# Patient Record
Sex: FEMALE | Race: WHITE
[De-identification: names, ages, dates, MRNs, and addresses within clinical notes are randomized per-mention and may not be internally consistent; named-entity substitution may affect disease eponyms.]

---

## 2018-06-15 ENCOUNTER — HOSPITAL ENCOUNTER (EMERGENCY)
Dept: HOSPITAL 94 - ER | Age: 43
Discharge: HOME | End: 2018-06-15
Payer: MEDICAID

## 2018-06-15 VITALS — DIASTOLIC BLOOD PRESSURE: 74 MMHG | SYSTOLIC BLOOD PRESSURE: 102 MMHG

## 2018-06-15 VITALS — WEIGHT: 132.28 LBS | HEIGHT: 62 IN | BODY MASS INDEX: 24.34 KG/M2

## 2018-06-15 DIAGNOSIS — F12.90: ICD-10-CM
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
## 2018-06-15 DIAGNOSIS — Z88.6: ICD-10-CM

## 2018-06-15 DIAGNOSIS — S01.01XA: Primary | ICD-10-CM
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
## 2018-06-15 DIAGNOSIS — Z90.49: ICD-10-CM

## 2018-06-15 DIAGNOSIS — Z79.899: ICD-10-CM

## 2018-06-15 DIAGNOSIS — F15.90: ICD-10-CM
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
## 2018-06-15 PROCEDURE — 12001 RPR S/N/AX/GEN/TRNK 2.5CM/<: CPT

## 2018-06-15 PROCEDURE — 99284 EMERGENCY DEPT VISIT MOD MDM: CPT

## 2018-06-15 PROCEDURE — 70450 CT HEAD/BRAIN W/O DYE: CPT

## 2018-09-15 ENCOUNTER — HOSPITAL ENCOUNTER (EMERGENCY)
Dept: HOSPITAL 94 - ER | Age: 43
Discharge: HOME | End: 2018-09-15
Payer: MEDICAID

## 2018-09-15 VITALS — HEIGHT: 62 IN | WEIGHT: 147.71 LBS | BODY MASS INDEX: 27.18 KG/M2

## 2018-09-15 VITALS — DIASTOLIC BLOOD PRESSURE: 85 MMHG | SYSTOLIC BLOOD PRESSURE: 122 MMHG

## 2018-09-15 DIAGNOSIS — F15.90: ICD-10-CM

## 2018-09-15 DIAGNOSIS — Z98.890: ICD-10-CM

## 2018-09-15 DIAGNOSIS — Z86.19: ICD-10-CM

## 2018-09-15 DIAGNOSIS — F41.9: Primary | ICD-10-CM

## 2018-09-15 DIAGNOSIS — Z79.899: ICD-10-CM

## 2018-09-15 DIAGNOSIS — Z88.8: ICD-10-CM

## 2018-09-15 DIAGNOSIS — F12.90: ICD-10-CM

## 2018-09-15 DIAGNOSIS — Z88.6: ICD-10-CM

## 2018-09-15 DIAGNOSIS — Z90.49: ICD-10-CM

## 2018-09-15 DIAGNOSIS — F32.9: ICD-10-CM

## 2018-09-15 PROCEDURE — 99284 EMERGENCY DEPT VISIT MOD MDM: CPT

## 2019-03-14 ENCOUNTER — HOSPITAL ENCOUNTER (EMERGENCY)
Dept: HOSPITAL 94 - ER | Age: 44
Discharge: LEFT BEFORE DISCHARGE | End: 2019-03-14
Payer: MEDICAID

## 2019-03-14 VITALS — DIASTOLIC BLOOD PRESSURE: 88 MMHG | SYSTOLIC BLOOD PRESSURE: 123 MMHG

## 2019-03-14 VITALS — WEIGHT: 160.34 LBS | BODY MASS INDEX: 29.51 KG/M2 | HEIGHT: 62 IN
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
## 2019-03-14 DIAGNOSIS — Z53.21: ICD-10-CM

## 2019-03-14 DIAGNOSIS — M79.641: ICD-10-CM

## 2019-03-14 DIAGNOSIS — M79.642: Primary | ICD-10-CM

## 2019-03-14 DIAGNOSIS — M79.604: ICD-10-CM
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
## 2019-03-14 DIAGNOSIS — M79.605: ICD-10-CM
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
## 2019-03-14 DIAGNOSIS — M54.9: ICD-10-CM

## 2019-03-14 PROCEDURE — 73130 X-RAY EXAM OF HAND: CPT

## 2019-03-22 ENCOUNTER — HOSPITAL ENCOUNTER (INPATIENT)
Dept: HOSPITAL 94 - ADULT MH | Age: 44
LOS: 4 days | Discharge: HOME | DRG: 754 | End: 2019-03-26
Attending: PSYCHIATRY & NEUROLOGY | Admitting: PSYCHIATRY & NEUROLOGY
Payer: MEDICAID

## 2019-03-22 VITALS — DIASTOLIC BLOOD PRESSURE: 53 MMHG | SYSTOLIC BLOOD PRESSURE: 95 MMHG

## 2019-03-22 VITALS — WEIGHT: 164.02 LBS | BODY MASS INDEX: 30.18 KG/M2 | HEIGHT: 62 IN

## 2019-03-22 DIAGNOSIS — Z87.440: ICD-10-CM

## 2019-03-22 DIAGNOSIS — I95.9: ICD-10-CM

## 2019-03-22 DIAGNOSIS — Z90.49: ICD-10-CM

## 2019-03-22 DIAGNOSIS — Z80.1: ICD-10-CM

## 2019-03-22 DIAGNOSIS — F11.20: ICD-10-CM

## 2019-03-22 DIAGNOSIS — F32.9: Primary | ICD-10-CM

## 2019-03-22 DIAGNOSIS — F17.210: ICD-10-CM

## 2019-03-22 DIAGNOSIS — Z82.3: ICD-10-CM

## 2019-03-22 DIAGNOSIS — Z88.8: ICD-10-CM

## 2019-03-22 DIAGNOSIS — Z59.0: ICD-10-CM

## 2019-03-22 DIAGNOSIS — B19.20: ICD-10-CM

## 2019-03-22 DIAGNOSIS — F10.10: ICD-10-CM

## 2019-03-22 DIAGNOSIS — F41.9: ICD-10-CM

## 2019-03-22 DIAGNOSIS — Z86.61: ICD-10-CM

## 2019-03-22 DIAGNOSIS — Z98.84: ICD-10-CM

## 2019-03-22 DIAGNOSIS — Z88.6: ICD-10-CM

## 2019-03-22 DIAGNOSIS — D64.9: ICD-10-CM

## 2019-03-22 PROCEDURE — 84100 ASSAY OF PHOSPHORUS: CPT

## 2019-03-22 PROCEDURE — 82607 VITAMIN B-12: CPT

## 2019-03-22 PROCEDURE — 83540 ASSAY OF IRON: CPT

## 2019-03-22 PROCEDURE — 80053 COMPREHEN METABOLIC PANEL: CPT

## 2019-03-22 PROCEDURE — 83735 ASSAY OF MAGNESIUM: CPT

## 2019-03-22 PROCEDURE — 80320 DRUG SCREEN QUANTALCOHOLS: CPT

## 2019-03-22 PROCEDURE — 85610 PROTHROMBIN TIME: CPT

## 2019-03-22 PROCEDURE — 87070 CULTURE OTHR SPECIMN AEROBIC: CPT

## 2019-03-22 PROCEDURE — 83036 HEMOGLOBIN GLYCOSYLATED A1C: CPT

## 2019-03-22 PROCEDURE — 80061 LIPID PANEL: CPT

## 2019-03-22 PROCEDURE — 36415 COLL VENOUS BLD VENIPUNCTURE: CPT

## 2019-03-22 PROCEDURE — 85025 COMPLETE CBC W/AUTO DIFF WBC: CPT

## 2019-03-22 SDOH — ECONOMIC STABILITY - HOUSING INSECURITY: HOMELESSNESS: Z59.0
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
## 2019-03-23 VITALS — SYSTOLIC BLOOD PRESSURE: 94 MMHG | DIASTOLIC BLOOD PRESSURE: 42 MMHG

## 2019-03-23 VITALS — SYSTOLIC BLOOD PRESSURE: 97 MMHG | DIASTOLIC BLOOD PRESSURE: 57 MMHG

## 2019-03-23 VITALS — DIASTOLIC BLOOD PRESSURE: 65 MMHG | SYSTOLIC BLOOD PRESSURE: 102 MMHG

## 2019-03-23 LAB
ALBUMIN SERPL BCP-MCNC: 2.8 G/DL (ref 3.4–5)
ALBUMIN/GLOB SERPL: 0.7 {RATIO} (ref 1.1–1.5)
ALP SERPL-CCNC: 238 IU/L (ref 46–116)
ALT SERPL W P-5'-P-CCNC: 39 U/L (ref 12–78)
ANION GAP SERPL CALCULATED.3IONS-SCNC: 7 MMOL/L (ref 8–16)
ANISOCYTOSIS BLD QL SMEAR: (no result)
AST SERPL W P-5'-P-CCNC: 42 U/L (ref 10–37)
BASOPHILS # BLD AUTO: 0 X10'3 (ref 0–0.2)
BASOPHILS NFR BLD AUTO: 0.6 % (ref 0–1)
BILIRUB SERPL-MCNC: 0.2 MG/DL (ref 0.1–1)
BUN SERPL-MCNC: 13 MG/DL (ref 7–18)
BUN/CREAT SERPL: 15.1 (ref 6.6–38)
CALCIUM SERPL-MCNC: 8.6 MG/DL (ref 8.5–10.1)
CHLORIDE SERPL-SCNC: 107 MMOL/L (ref 99–107)
CHOLEST SERPL-MCNC: 125 MG/DL (ref 0–200)
CHOLEST/HDLC SERPL: 2.6 {RATIO} (ref 0–4.99)
CO2 SERPL-SCNC: 27.4 MMOL/L (ref 24–32)
CREAT SERPL-MCNC: 0.86 MG/DL (ref 0.4–0.9)
DACRYOCYTES BLD QL SMEAR: (no result)
EOSINOPHIL # BLD AUTO: 0.1 X10'3 (ref 0–0.9)
EOSINOPHIL NFR BLD AUTO: 3.1 % (ref 0–6)
ERYTHROCYTE [DISTWIDTH] IN BLOOD BY AUTOMATED COUNT: 20.9 % (ref 11.5–14.5)
ETHANOL SERPL-MCNC: < 0.01 GM/DL (ref 0–0.01)
GFR SERPL CREATININE-BSD FRML MDRD: 72 ML/MIN
GLUCOSE SERPL-MCNC: 95 MG/DL (ref 70–104)
HBA1C MFR BLD: 5.3 % (ref 4.5–6.2)
HCT VFR BLD AUTO: 29.1 % (ref 35–45)
HDLC SERPL-MCNC: 48 MG/DL (ref 35–60)
HGB BLD-MCNC: 8.8 G/DL (ref 12–16)
HYPOCHROMIA BLD QL SMEAR: (no result)
LDLC SERPL DIRECT ASSAY-MCNC: 71 MG/DL (ref 50–100)
LYMPHOCYTES # BLD AUTO: 1.1 X10'3 (ref 1.1–4.8)
LYMPHOCYTES NFR BLD AUTO: 34.7 % (ref 21–51)
MCH RBC QN AUTO: 20.4 PG (ref 27–31)
MCHC RBC AUTO-ENTMCNC: 30.4 G/DL (ref 33–36.5)
MCV RBC AUTO: 67 FL (ref 78–98)
MICROCYTES BLD QL SMEAR: (no result)
MONOCYTES # BLD AUTO: 0.3 X10'3 (ref 0–0.9)
MONOCYTES NFR BLD AUTO: 10 % (ref 2–12)
NEUTROPHILS # BLD AUTO: 1.6 X10'3 (ref 1.8–7.7)
NEUTROPHILS NFR BLD AUTO: 51.6 % (ref 42–75)
PLATELET # BLD AUTO: 214 X10'3 (ref 140–440)
PLATELET BLD QL SMEAR: NORMAL
PMV BLD AUTO: 8.6 FL (ref 7.4–10.4)
POLYCHROMASIA BLD QL SMEAR: (no result)
POTASSIUM SERPL-SCNC: 4.4 MMOL/L (ref 3.5–5.1)
PROT SERPL-MCNC: 6.9 G/DL (ref 6.4–8.2)
RBC # BLD AUTO: 4.35 X10'6 (ref 4.2–5.6)
RBC MORPH BLD: (no result)
SODIUM SERPL-SCNC: 141 MMOL/L (ref 135–145)
STOMATOCYTES BLD QL SMEAR: (no result)
TRIGL SERPL-MCNC: 43 MG/DL (ref 20–135)
WBC # BLD AUTO: 3.1 X10'3 (ref 4.5–11)

## 2019-03-23 RX ADMIN — ONDANSETRON PRN MG: 4 TABLET, ORALLY DISINTEGRATING ORAL at 17:41

## 2019-03-23 RX ADMIN — THERA TABS SCH EACH: TAB at 11:00

## 2019-03-23 RX ADMIN — METHADONE HYDROCHLORIDE SCH MG: 10 TABLET ORAL at 12:55
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
## 2019-03-23 NOTE — NUR
METHADONE DOSE VERIFICATION

The patient attends [facility]. Methadone dose was verified today as 140mg qd. 
Her last dose prior to admission was Thursday 3/21/19 at 1300 per [facility]. Dr. Burger was made aware and new order received and carried out.

## 2019-03-24 VITALS — DIASTOLIC BLOOD PRESSURE: 55 MMHG | SYSTOLIC BLOOD PRESSURE: 113 MMHG

## 2019-03-24 VITALS — DIASTOLIC BLOOD PRESSURE: 47 MMHG | SYSTOLIC BLOOD PRESSURE: 119 MMHG

## 2019-03-24 VITALS — DIASTOLIC BLOOD PRESSURE: 62 MMHG | SYSTOLIC BLOOD PRESSURE: 98 MMHG

## 2019-03-24 LAB
INR PPP: 1.1 INR
MAGNESIUM SERPL-MCNC: 2.1 MG/DL (ref 1.5–2.4)
PHOSPHATE SERPL-MCNC: 4.2 MG/DL (ref 2.3–4.5)
PROTHROMBIN TIME: 10.7 SECONDS (ref 9–12)

## 2019-03-24 RX ADMIN — ONDANSETRON PRN MG: 4 TABLET, ORALLY DISINTEGRATING ORAL at 04:56

## 2019-03-24 RX ADMIN — THERA TABS SCH EACH: TAB at 08:55

## 2019-03-24 RX ADMIN — METHADONE HYDROCHLORIDE SCH MG: 10 TABLET ORAL at 08:55

## 2019-03-24 NOTE — NUR
Nutrition consult: Pt admit w/ depression w/ hx cipriano-en-y, etoh, and 

pernicious anemia. MCV 67 on admit surprisingly low given hx. RD d/w RN 

for B12 and Fe labs per MD approval. Pt receiving thiamin/folic/MVI for 

etoh hx meeting vitamin needs. % mechanical soft diet meeting needs. 

If B12 returns normal may benefit from MMA lab for further specificity 

given GI hx. LBM 3/23. Will continue to monitor.

Rec:

1. continue mechanical soft diet

2. thiamin/folic/MVI for etoh; following protocol MVM for cipriano-en-y hx

3. monitor for further Fe/B12 needs given labs per MD approval

4. wt per rx

-------------------------------------------------------------------------------

Addendum: 03/24/19 at 1247 by Brody Correia RD

-------------------------------------------------------------------------------

Amended: Links added.
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
## 2019-03-24 NOTE — NUR
Pt had tremors w/outstretched arms, moderate headache and sweating, and some anxiety, she 
was provided w/Ativan 2mg. She attempted to watch tv but fell asleep in rec room, was 
assisted back to bed.  She is currently sleeping, rr even and unlabored, no s/s distress. 

-------------------------------------------------------------------------------

Addendum: 03/24/19 at 0440 by Odette Moreno RN

-------------------------------------------------------------------------------

Amended: Links added.

## 2019-03-24 NOTE — NUR
Nursing Progress Note



Legal hold: 5150



Client on involuntary status for DTS.



Received report from Fabi WALLER



Why are they here: Pt reporting thoughts of overdosing on heroin if she went home or was 
left home. Reported increase in depression and suicidal thoughts, overwhelmed with guilt, 
hopelessness "things will not change." Drinking more to cope



Assessment



What happened this shift: 

Received patient lying in bed. Patient was sedated and difficult to fully aroused. Patient 
did get up for breakfast and received her medications. Patient agreeable to sitting up in 
the room and remaining on the unit after breakfast. Patient remains on CIWA every two hours 
in the morning but was decreased to every four hours because of low scores. Patient did seek 
meds toward the middle of the day complaining of more anxiety and wanting Ativan. PA 
questioned whether he wanted her to have some and he gave order to discontinue all Ativan 
and offer atarax which patient refused. Patient upset initially, but eventually went to her 
room and lay down taking a nap until dinner. No further complaints. Patient did not endorse 
depression and denies suicidal thoughts today.



S/I, H/I: denies s/i 

A/VH: denies 

Sleep: cat naps through out the day. 

ADL's: Independent

Group attendance:  attended groups 

Were meds taken: yes

Any med S/E: N/A



Mental Status Exam



Appearance: unkempt, wearing heavy jacket and pajamas. 

Eye contact: Direct

Behavior: Cooperative and friendly, laughing 

Speech: Clear, soft tone, normal rate/rhythm 

Mood:  reports anxiety and agitation, none observed

Affect: constricted 

Thought process: linear 

Thought Content: talking about her relationship w/her daughter

Cognition: A/O x4 

Insight: Fair

Judgment: Fair



Interventions



PRN's used: 



Therapeutic interventions: 1:1 therapeutic assessment, active listening that included 
positive feedback, medication education, and monitoring, maintained safe therapeutic milieu, 
encouraged to attend groups, Q 15 min safety checks.



Restraints/seclusion/emergency medication: N/A



Justification of Continued Inpatient Treatment: Continued therapeutic support and medication 
management needed to provide stabilization, prevent decompensation while decreasing risk to 
patient.

## 2019-03-25 VITALS — DIASTOLIC BLOOD PRESSURE: 64 MMHG | SYSTOLIC BLOOD PRESSURE: 100 MMHG

## 2019-03-25 VITALS — SYSTOLIC BLOOD PRESSURE: 100 MMHG | DIASTOLIC BLOOD PRESSURE: 52 MMHG

## 2019-03-25 LAB
INR PPP: 1 INR
MAGNESIUM SERPL-MCNC: 2.1 MG/DL (ref 1.5–2.4)
PHOSPHATE SERPL-MCNC: 4.1 MG/DL (ref 2.3–4.5)
PROTHROMBIN TIME: 10.5 SECONDS (ref 9–12)

## 2019-03-25 RX ADMIN — ONDANSETRON PRN MG: 4 TABLET, ORALLY DISINTEGRATING ORAL at 07:21

## 2019-03-25 RX ADMIN — THERA TABS SCH EACH: TAB at 07:27

## 2019-03-25 RX ADMIN — METHADONE HYDROCHLORIDE SCH MG: 10 TABLET ORAL at 07:22
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
## 2019-03-26 VITALS — SYSTOLIC BLOOD PRESSURE: 94 MMHG | DIASTOLIC BLOOD PRESSURE: 56 MMHG

## 2019-03-26 LAB
INR PPP: 1 INR
MAGNESIUM SERPL-MCNC: 2 MG/DL (ref 1.5–2.4)
PHOSPHATE SERPL-MCNC: 4.3 MG/DL (ref 2.3–4.5)
PROTHROMBIN TIME: 10.4 SECONDS (ref 9–12)

## 2019-03-26 RX ADMIN — METHADONE HYDROCHLORIDE SCH MG: 10 TABLET ORAL at 07:12

## 2019-03-26 RX ADMIN — THERA TABS SCH EACH: TAB at 07:19

## 2019-03-26 NOTE — NUR
Nursing Discharge Note



Patient is accompanied off the unit by Charge Nurse Fabi. She is picked up by her SO and 
transported to her friends home at [address] [place] [address]. All valuables were with 
patient. Discharge instructions were provided to patient and she will make her own 
appointments. Handout with instructions given to patient. Her affect is bright and she 
appears happy to be leaving. Patient denies S/I this morning during assessment and no 
suicidal statements were made throughout the day. Patient has improved since admit but 
states she will drink when she gets off the unit. Coping skills are gone over with patient 
and numbers/places to access for help are provided. No acute physical or emotional distress 
observed. Nicotine replacement is not offered as it is N/A.

## 2019-12-20 ENCOUNTER — HOSPITAL ENCOUNTER (EMERGENCY)
Dept: HOSPITAL 94 - ER | Age: 44
LOS: 1 days | Discharge: PSYCHIATRIC OR SUBSTANCE USE FACILITY | End: 2019-12-21
Payer: MEDICAID

## 2019-12-20 VITALS — BODY MASS INDEX: 33.23 KG/M2 | HEIGHT: 62 IN | WEIGHT: 180.56 LBS

## 2019-12-20 DIAGNOSIS — Z79.899: ICD-10-CM

## 2019-12-20 DIAGNOSIS — F17.200: ICD-10-CM

## 2019-12-20 DIAGNOSIS — F32.9: Primary | ICD-10-CM

## 2019-12-20 DIAGNOSIS — Z98.890: ICD-10-CM

## 2019-12-20 DIAGNOSIS — Z88.8: ICD-10-CM

## 2019-12-20 DIAGNOSIS — F10.10: ICD-10-CM

## 2019-12-20 DIAGNOSIS — Z86.2: ICD-10-CM

## 2019-12-20 DIAGNOSIS — F41.9: ICD-10-CM

## 2019-12-20 DIAGNOSIS — F12.90: ICD-10-CM

## 2019-12-20 DIAGNOSIS — R74.0: ICD-10-CM

## 2019-12-20 DIAGNOSIS — Z86.19: ICD-10-CM

## 2019-12-20 DIAGNOSIS — Z98.51: ICD-10-CM

## 2019-12-20 DIAGNOSIS — Z90.49: ICD-10-CM
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
## 2019-12-20 DIAGNOSIS — F15.90: ICD-10-CM

## 2019-12-20 LAB
ALBUMIN SERPL BCP-MCNC: 2.9 G/DL (ref 3.4–5)
ALBUMIN/GLOB SERPL: 0.6 {RATIO} (ref 1.1–1.5)
ALP SERPL-CCNC: 458 IU/L (ref 46–116)
ALT SERPL W P-5'-P-CCNC: 109 U/L (ref 12–78)
AMPHETAMINES UR QL SCN: POSITIVE
ANION GAP SERPL CALCULATED.3IONS-SCNC: 9 MMOL/L (ref 8–16)
ANISOCYTOSIS BLD QL SMEAR: (no result)
AST SERPL W P-5'-P-CCNC: 212 U/L (ref 10–37)
BARBITURATES UR QL SCN: NEGATIVE
BASOPHILS # BLD AUTO: 0.1 X10'3 (ref 0–0.2)
BASOPHILS NFR BLD AUTO: 1.2 % (ref 0–1)
BENZODIAZ UR QL SCN: NEGATIVE
BILIRUB SERPL-MCNC: 0.3 MG/DL (ref 0.1–1)
BUN SERPL-MCNC: 4 MG/DL (ref 7–18)
BUN/CREAT SERPL: 5.3 (ref 6.6–38)
BZE UR QL SCN: NEGATIVE
CALCIUM SERPL-MCNC: 8.4 MG/DL (ref 8.5–10.1)
CANNABINOIDS UR QL SCN: NEGATIVE
CHLORIDE SERPL-SCNC: 105 MMOL/L (ref 99–107)
CO2 SERPL-SCNC: 26 MMOL/L (ref 24–32)
CREAT SERPL-MCNC: 0.75 MG/DL (ref 0.4–0.9)
EOSINOPHIL # BLD AUTO: 0 X10'3 (ref 0–0.9)
EOSINOPHIL NFR BLD AUTO: 0.3 % (ref 0–6)
ERYTHROCYTE [DISTWIDTH] IN BLOOD BY AUTOMATED COUNT: 22.9 % (ref 11.5–14.5)
ETHANOL SERPL-MCNC: 0.14 GM/DL (ref 0–0.01)
GFR SERPL CREATININE-BSD FRML MDRD: 84 ML/MIN
GLUCOSE SERPL-MCNC: 81 MG/DL (ref 70–104)
HCG UR QL: NEGATIVE
HCT VFR BLD AUTO: 32.5 % (ref 35–45)
HGB BLD-MCNC: 10.1 G/DL (ref 12–16)
HYPOCHROMIA BLD QL SMEAR: (no result)
LYMPHOCYTES # BLD AUTO: 1.2 X10'3 (ref 1.1–4.8)
LYMPHOCYTES NFR BLD AUTO: 19.8 % (ref 21–51)
MCH RBC QN AUTO: 22.3 PG (ref 27–31)
MCHC RBC AUTO-ENTMCNC: 31.1 G/DL (ref 33–36.5)
MCV RBC AUTO: 71.7 FL (ref 78–98)
METHADONE UR QL SCN: POSITIVE
MICROCYTES BLD QL SMEAR: (no result)
MONOCYTES # BLD AUTO: 0.4 X10'3 (ref 0–0.9)
MONOCYTES NFR BLD AUTO: 5.8 % (ref 2–12)
NEUTROPHILS # BLD AUTO: 4.6 X10'3 (ref 1.8–7.7)
NEUTROPHILS NFR BLD AUTO: 72.9 % (ref 42–75)
OPIATES UR QL SCN: POSITIVE
PCP UR QL SCN: NEGATIVE
PLATELET # BLD AUTO: 253 X10'3 (ref 140–440)
PLATELET BLD QL SMEAR: NORMAL
PMV BLD AUTO: 6.7 FL (ref 7.4–10.4)
POIKILOCYTOSIS BLD QL SMEAR: (no result)
POLYCHROMASIA BLD QL SMEAR: (no result)
POTASSIUM SERPL-SCNC: 3.6 MMOL/L (ref 3.5–5.1)
PROT SERPL-MCNC: 7.6 G/DL (ref 6.4–8.2)
RBC # BLD AUTO: 4.53 X10'6 (ref 4.2–5.6)
RBC MORPH BLD: (no result)
SODIUM SERPL-SCNC: 140 MMOL/L (ref 135–145)
WBC # BLD AUTO: 6.3 X10'3 (ref 4.5–11)

## 2019-12-20 PROCEDURE — 80053 COMPREHEN METABOLIC PANEL: CPT

## 2019-12-20 PROCEDURE — 36415 COLL VENOUS BLD VENIPUNCTURE: CPT

## 2019-12-20 PROCEDURE — 84443 ASSAY THYROID STIM HORMONE: CPT

## 2019-12-20 PROCEDURE — 99285 EMERGENCY DEPT VISIT HI MDM: CPT

## 2019-12-20 PROCEDURE — 85025 COMPLETE CBC W/AUTO DIFF WBC: CPT

## 2019-12-20 PROCEDURE — 80320 DRUG SCREEN QUANTALCOHOLS: CPT

## 2019-12-20 PROCEDURE — 81025 URINE PREGNANCY TEST: CPT

## 2019-12-20 PROCEDURE — 80305 DRUG TEST PRSMV DIR OPT OBS: CPT
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
## 2019-12-20 NOTE — NUR
Med Reconcilication: Attempted to complete Med Rec, however unable to do so per 
pt. reports the only medicatoin she currently takes is Methadone and she is 
unaware of the current dose. Pt. reports that she is currently being treated at 
[facility], and her dose of Methadone is being tapered daily. She states that 
staff should be able to call the clinic to obtain her current dosing orders, 
will endorse to AM shift. Pt. dose report that her last dose of Methadone was 
today, 30mg.

## 2019-12-20 NOTE — NUR
Assumed care of patient, pt. sitting up talking with [facility] at this time, she 
appears calm and cooperative, rr even and ulabored.

## 2019-12-20 NOTE — NUR
Pt. c/o possible withdrawal s/s AEB sweating, nausea, and she exhibits slight 
bilateral upper extremity fine tremors. Obtained order from Dr. Laws for 2mg 
of Ativan, will continue to monitor. 



Also reported to Dr. Laws reddened abraisions with some enduration present 
on pt's bilateral lower extremities. MD advises this writer to, "Have AM shift 
doctor follow-up." Will endorse to AM shift. Abraisions cleaned with N/S and 
applied ABT ointment per MD orders. Pictures obtained and placed in pt's chart.

## 2019-12-21 VITALS — DIASTOLIC BLOOD PRESSURE: 66 MMHG | SYSTOLIC BLOOD PRESSURE: 107 MMHG

## 2019-12-21 NOTE — NUR
Pt. refused Librium, states, "It makes me sick," endorsed to AM shift, RN

-------------------------------------------------------------------------------

Addendum: 12/21/19 at 0654 by KERA

-------------------------------------------------------------------------------

Medicationed returned to pharmacy

## 2019-12-21 NOTE — NUR
SITTING UP IN BED WITHOUT COMPLAINTS AT THIS TIME. STATES NAUSEA SUBSIDING NOW 
AND WAS ABLE TO TOLERATE BREAKFAST.

## 2019-12-21 NOTE — NUR
Pt. reporting s/s of withdrawal AEB sweating, nausea, and fine tremors. 
Obtained order from Dr. Chang for Librium, will continue to monitor.

## 2020-01-10 ENCOUNTER — HOSPITAL ENCOUNTER (EMERGENCY)
Dept: HOSPITAL 94 - ER | Age: 45
Discharge: HOME | End: 2020-01-10
Payer: MEDICAID

## 2020-01-10 VITALS — WEIGHT: 154.32 LBS | BODY MASS INDEX: 28.4 KG/M2 | HEIGHT: 62 IN

## 2020-01-10 VITALS — SYSTOLIC BLOOD PRESSURE: 20 MMHG

## 2020-01-10 VITALS — DIASTOLIC BLOOD PRESSURE: 60 MMHG

## 2020-01-10 DIAGNOSIS — F32.9: ICD-10-CM

## 2020-01-10 DIAGNOSIS — Z98.890: ICD-10-CM

## 2020-01-10 DIAGNOSIS — Z79.899: ICD-10-CM

## 2020-01-10 DIAGNOSIS — F41.9: Primary | ICD-10-CM

## 2020-01-10 DIAGNOSIS — Z98.84: ICD-10-CM

## 2020-01-10 DIAGNOSIS — Z88.6: ICD-10-CM

## 2020-01-10 DIAGNOSIS — Z88.8: ICD-10-CM

## 2020-01-10 DIAGNOSIS — F12.90: ICD-10-CM

## 2020-01-10 DIAGNOSIS — Z90.49: ICD-10-CM

## 2020-01-10 DIAGNOSIS — F15.90: ICD-10-CM

## 2020-01-10 DIAGNOSIS — Z76.0: ICD-10-CM

## 2020-01-10 PROCEDURE — 99283 EMERGENCY DEPT VISIT LOW MDM: CPT

## 2020-01-18 ENCOUNTER — HOSPITAL ENCOUNTER (EMERGENCY)
Dept: HOSPITAL 94 - ER | Age: 45
Discharge: HOME | End: 2020-01-18
Payer: MEDICAID

## 2020-01-18 VITALS — BODY MASS INDEX: 32.46 KG/M2 | HEIGHT: 62 IN | WEIGHT: 176.37 LBS

## 2020-01-18 VITALS — SYSTOLIC BLOOD PRESSURE: 123 MMHG | DIASTOLIC BLOOD PRESSURE: 81 MMHG

## 2020-01-18 DIAGNOSIS — Z88.6: ICD-10-CM

## 2020-01-18 DIAGNOSIS — F15.90: ICD-10-CM

## 2020-01-18 DIAGNOSIS — F41.9: Primary | ICD-10-CM

## 2020-01-18 DIAGNOSIS — F32.9: ICD-10-CM

## 2020-01-18 DIAGNOSIS — F12.90: ICD-10-CM

## 2020-01-18 DIAGNOSIS — Z79.899: ICD-10-CM

## 2020-01-18 DIAGNOSIS — Z90.49: ICD-10-CM

## 2020-01-18 DIAGNOSIS — Z98.84: ICD-10-CM

## 2020-01-18 DIAGNOSIS — Z88.8: ICD-10-CM

## 2020-01-18 LAB
ALBUMIN SERPL BCP-MCNC: 3.4 G/DL (ref 3.4–5)
ALBUMIN/GLOB SERPL: 0.8 {RATIO} (ref 1.1–1.5)
ALP SERPL-CCNC: 283 IU/L (ref 46–116)
ALT SERPL W P-5'-P-CCNC: 38 U/L (ref 12–78)
ANION GAP SERPL CALCULATED.3IONS-SCNC: 8 MMOL/L (ref 8–16)
AST SERPL W P-5'-P-CCNC: 45 U/L (ref 10–37)
BASOPHILS # BLD AUTO: 0 X10'3 (ref 0–0.2)
BASOPHILS NFR BLD AUTO: 0.5 % (ref 0–1)
BILIRUB SERPL-MCNC: 0.3 MG/DL (ref 0.1–1)
BUN SERPL-MCNC: 6 MG/DL (ref 7–18)
BUN/CREAT SERPL: 7.9 (ref 6.6–38)
CALCIUM SERPL-MCNC: 8.8 MG/DL (ref 8.5–10.1)
CHLORIDE SERPL-SCNC: 105 MMOL/L (ref 99–107)
CO2 SERPL-SCNC: 28 MMOL/L (ref 24–32)
CREAT SERPL-MCNC: 0.76 MG/DL (ref 0.4–0.9)
EOSINOPHIL # BLD AUTO: 0.1 X10'3 (ref 0–0.9)
EOSINOPHIL NFR BLD AUTO: 2.8 % (ref 0–6)
ERYTHROCYTE [DISTWIDTH] IN BLOOD BY AUTOMATED COUNT: 21.3 % (ref 11.5–14.5)
ETHANOL SERPL-MCNC: < 0.01 GM/DL (ref 0–0.01)
GFR SERPL CREATININE-BSD FRML MDRD: 83 ML/MIN
GLUCOSE SERPL-MCNC: 110 MG/DL (ref 70–104)
HCT VFR BLD AUTO: 31.5 % (ref 35–45)
HGB BLD-MCNC: 9.6 G/DL (ref 12–16)
LYMPHOCYTES # BLD AUTO: 1.3 X10'3 (ref 1.1–4.8)
LYMPHOCYTES NFR BLD AUTO: 24.2 % (ref 21–51)
MCH RBC QN AUTO: 21.1 PG (ref 27–31)
MCHC RBC AUTO-ENTMCNC: 30.5 G/DL (ref 33–36.5)
MCV RBC AUTO: 69.1 FL (ref 78–98)
MONOCYTES # BLD AUTO: 0.4 X10'3 (ref 0–0.9)
MONOCYTES NFR BLD AUTO: 7.6 % (ref 2–12)
NEUTROPHILS # BLD AUTO: 3.4 X10'3 (ref 1.8–7.7)
NEUTROPHILS NFR BLD AUTO: 64.9 % (ref 42–75)
PLATELET # BLD AUTO: 291 X10'3 (ref 140–440)
PMV BLD AUTO: 7.3 FL (ref 7.4–10.4)
POTASSIUM SERPL-SCNC: 4.1 MMOL/L (ref 3.5–5.1)
PROT SERPL-MCNC: 7.5 G/DL (ref 6.4–8.2)
RBC # BLD AUTO: 4.55 X10'6 (ref 4.2–5.6)
SODIUM SERPL-SCNC: 141 MMOL/L (ref 135–145)
WBC # BLD AUTO: 5.2 X10'3 (ref 4.5–11)

## 2020-01-18 PROCEDURE — 80320 DRUG SCREEN QUANTALCOHOLS: CPT

## 2020-01-18 PROCEDURE — 80053 COMPREHEN METABOLIC PANEL: CPT

## 2020-01-18 PROCEDURE — 36415 COLL VENOUS BLD VENIPUNCTURE: CPT

## 2020-01-18 PROCEDURE — 99284 EMERGENCY DEPT VISIT MOD MDM: CPT

## 2020-01-18 PROCEDURE — 85025 COMPLETE CBC W/AUTO DIFF WBC: CPT

## 2020-01-19 LAB
ANISOCYTOSIS BLD QL SMEAR: (no result)
HYPOCHROMIA BLD QL SMEAR: (no result)
MICROCYTES BLD QL SMEAR: (no result)
PLATELET BLD QL SMEAR: NORMAL
RBC MORPH BLD: (no result)

## 2020-04-28 ENCOUNTER — HOSPITAL ENCOUNTER (EMERGENCY)
Dept: HOSPITAL 94 - ER | Age: 45
LOS: 1 days | Discharge: HOME | End: 2020-04-29
Payer: MEDICAID

## 2020-04-28 VITALS — BODY MASS INDEX: 33.19 KG/M2 | HEIGHT: 62 IN | WEIGHT: 180.38 LBS

## 2020-04-28 DIAGNOSIS — K29.20: ICD-10-CM

## 2020-04-28 DIAGNOSIS — E86.0: ICD-10-CM

## 2020-04-28 DIAGNOSIS — Z98.0: ICD-10-CM

## 2020-04-28 DIAGNOSIS — Z98.890: ICD-10-CM

## 2020-04-28 DIAGNOSIS — Z88.8: ICD-10-CM

## 2020-04-28 DIAGNOSIS — Z79.899: ICD-10-CM

## 2020-04-28 DIAGNOSIS — R11.2: Primary | ICD-10-CM

## 2020-04-28 DIAGNOSIS — Z88.6: ICD-10-CM

## 2020-04-28 DIAGNOSIS — F32.9: ICD-10-CM

## 2020-04-28 DIAGNOSIS — F10.10: ICD-10-CM

## 2020-04-28 DIAGNOSIS — F17.200: ICD-10-CM
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
## 2020-04-28 DIAGNOSIS — E87.6: ICD-10-CM

## 2020-04-28 DIAGNOSIS — Z90.49: ICD-10-CM

## 2020-04-28 DIAGNOSIS — F41.9: ICD-10-CM

## 2020-04-28 PROCEDURE — 96374 THER/PROPH/DIAG INJ IV PUSH: CPT

## 2020-04-28 PROCEDURE — 96361 HYDRATE IV INFUSION ADD-ON: CPT

## 2020-04-28 PROCEDURE — 81025 URINE PREGNANCY TEST: CPT

## 2020-04-28 PROCEDURE — 80305 DRUG TEST PRSMV DIR OPT OBS: CPT

## 2020-04-28 PROCEDURE — 87088 URINE BACTERIA CULTURE: CPT

## 2020-04-28 PROCEDURE — 87077 CULTURE AEROBIC IDENTIFY: CPT

## 2020-04-28 PROCEDURE — 83690 ASSAY OF LIPASE: CPT

## 2020-04-28 PROCEDURE — 87186 SC STD MICRODIL/AGAR DIL: CPT

## 2020-04-28 PROCEDURE — 36415 COLL VENOUS BLD VENIPUNCTURE: CPT

## 2020-04-28 PROCEDURE — 81001 URINALYSIS AUTO W/SCOPE: CPT

## 2020-04-28 PROCEDURE — 96375 TX/PRO/DX INJ NEW DRUG ADDON: CPT

## 2020-04-28 PROCEDURE — 80320 DRUG SCREEN QUANTALCOHOLS: CPT

## 2020-04-28 PROCEDURE — 99284 EMERGENCY DEPT VISIT MOD MDM: CPT

## 2020-04-28 PROCEDURE — 80053 COMPREHEN METABOLIC PANEL: CPT

## 2020-04-28 PROCEDURE — 85025 COMPLETE CBC W/AUTO DIFF WBC: CPT

## 2020-04-29 VITALS — SYSTOLIC BLOOD PRESSURE: 119 MMHG | DIASTOLIC BLOOD PRESSURE: 68 MMHG

## 2020-04-29 LAB
ALBUMIN SERPL BCP-MCNC: 3 G/DL (ref 3.4–5)
ALBUMIN/GLOB SERPL: 0.8 {RATIO} (ref 1.1–1.5)
ALP SERPL-CCNC: 303 IU/L (ref 46–116)
ALT SERPL W P-5'-P-CCNC: 48 U/L (ref 12–78)
AMPHETAMINES UR QL SCN: NEGATIVE
ANION GAP SERPL CALCULATED.3IONS-SCNC: 10 MMOL/L (ref 8–16)
ANISOCYTOSIS BLD QL SMEAR: (no result)
AST SERPL W P-5'-P-CCNC: 74 U/L (ref 10–37)
BACTERIA URNS QL MICRO: (no result) /HPF
BARBITURATES UR QL SCN: NEGATIVE
BASOPHILS # BLD AUTO: 0 X10'3 (ref 0–0.2)
BASOPHILS NFR BLD AUTO: 0.5 % (ref 0–1)
BENZODIAZ UR QL SCN: NEGATIVE
BILIRUB SERPL-MCNC: 0.4 MG/DL (ref 0.1–1)
BUN SERPL-MCNC: 5 MG/DL (ref 7–18)
BUN/CREAT SERPL: 6.6 (ref 6.6–38)
BZE UR QL SCN: NEGATIVE
CALCIUM SERPL-MCNC: 8.1 MG/DL (ref 8.5–10.1)
CANNABINOIDS UR QL SCN: POSITIVE
CHLORIDE SERPL-SCNC: 106 MMOL/L (ref 99–107)
CLARITY UR: CLEAR
CO2 SERPL-SCNC: 24.6 MMOL/L (ref 24–32)
COLOR UR: (no result)
CREAT SERPL-MCNC: 0.76 MG/DL (ref 0.4–0.9)
DEPRECATED SQUAMOUS URNS QL MICRO: (no result) /LPF
EOSINOPHIL # BLD AUTO: 0 X10'3 (ref 0–0.9)
EOSINOPHIL NFR BLD AUTO: 1 % (ref 0–6)
ERYTHROCYTE [DISTWIDTH] IN BLOOD BY AUTOMATED COUNT: 23.2 % (ref 11.5–14.5)
ETHANOL SERPL-MCNC: 0.02 GM/DL (ref 0–0.01)
GFR SERPL CREATININE-BSD FRML MDRD: 83 ML/MIN
GLUCOSE SERPL-MCNC: 80 MG/DL (ref 70–104)
GLUCOSE UR STRIP-MCNC: NEGATIVE MG/DL
HCG UR QL: NEGATIVE
HCT VFR BLD AUTO: 30.6 % (ref 35–45)
HGB BLD-MCNC: 9.3 G/DL (ref 12–16)
HGB UR QL STRIP: (no result)
KETONES UR STRIP-MCNC: NEGATIVE MG/DL
LEUKOCYTE ESTERASE UR QL STRIP: (no result)
LIPASE SERPL-CCNC: 138 U/L (ref 73–393)
LYMPHOCYTES # BLD AUTO: 0.9 X10'3 (ref 1.1–4.8)
LYMPHOCYTES NFR BLD AUTO: 21.7 % (ref 21–51)
MCH RBC QN AUTO: 21.6 PG (ref 27–31)
MCHC RBC AUTO-ENTMCNC: 30.4 G/DL (ref 33–36.5)
MCV RBC AUTO: 71 FL (ref 78–98)
METHADONE UR QL SCN: POSITIVE
MICROCYTES BLD QL SMEAR: (no result)
MONOCYTES # BLD AUTO: 0.6 X10'3 (ref 0–0.9)
MONOCYTES NFR BLD AUTO: 12.7 % (ref 2–12)
NEUTROPHILS # BLD AUTO: 2.8 X10'3 (ref 1.8–7.7)
NEUTROPHILS NFR BLD AUTO: 64.1 % (ref 42–75)
NITRITE UR QL STRIP: NEGATIVE
OPIATES UR QL SCN: NEGATIVE
PCP UR QL SCN: NEGATIVE
PH UR STRIP: 6.5 [PH] (ref 4.8–8)
PLATELET # BLD AUTO: 199 X10'3 (ref 140–440)
PLATELET BLD QL SMEAR: NORMAL
PMV BLD AUTO: 8.3 FL (ref 7.4–10.4)
POTASSIUM SERPL-SCNC: 2.8 MMOL/L (ref 3.5–5.1)
PROT SERPL-MCNC: 7 G/DL (ref 6.4–8.2)
PROT UR QL STRIP: NEGATIVE MG/DL
RBC # BLD AUTO: 4.32 X10'6 (ref 4.2–5.6)
RBC #/AREA URNS HPF: (no result) /HPF (ref 0–2)
RBC MORPH BLD: (no result)
SODIUM SERPL-SCNC: 141 MMOL/L (ref 135–145)
SP GR UR STRIP: <=1.005 (ref 1–1.03)
URN COLLECT METHOD CLASS: (no result)
UROBILINOGEN UR STRIP-MCNC: 0.2 E.U/DL (ref 0.2–1)
WBC # BLD AUTO: 4.4 X10'3 (ref 4.5–11)
WBC #/AREA URNS HPF: (no result) /HPF (ref 0–4)

## 2020-07-31 ENCOUNTER — HOSPITAL ENCOUNTER (EMERGENCY)
Dept: HOSPITAL 94 - ER | Age: 45
Discharge: HOME | End: 2020-07-31
Payer: MEDICAID

## 2020-07-31 VITALS — HEIGHT: 62 IN | WEIGHT: 183.38 LBS | BODY MASS INDEX: 33.75 KG/M2

## 2020-07-31 VITALS — DIASTOLIC BLOOD PRESSURE: 61 MMHG | SYSTOLIC BLOOD PRESSURE: 101 MMHG

## 2020-07-31 DIAGNOSIS — Z86.2: ICD-10-CM

## 2020-07-31 DIAGNOSIS — F41.9: ICD-10-CM

## 2020-07-31 DIAGNOSIS — M54.5: ICD-10-CM

## 2020-07-31 DIAGNOSIS — Z79.899: ICD-10-CM

## 2020-07-31 DIAGNOSIS — N10: Primary | ICD-10-CM

## 2020-07-31 DIAGNOSIS — F32.9: ICD-10-CM

## 2020-07-31 DIAGNOSIS — Z86.19: ICD-10-CM

## 2020-07-31 DIAGNOSIS — F10.10: ICD-10-CM

## 2020-07-31 DIAGNOSIS — R11.10: ICD-10-CM

## 2020-07-31 DIAGNOSIS — Z90.49: ICD-10-CM

## 2020-07-31 LAB
ALBUMIN SERPL BCP-MCNC: 3.1 G/DL (ref 3.4–5)
ALBUMIN/GLOB SERPL: 0.7 {RATIO} (ref 1.1–1.5)
ALP SERPL-CCNC: 274 IU/L (ref 46–116)
ALT SERPL W P-5'-P-CCNC: 48 U/L (ref 12–78)
ANION GAP SERPL CALCULATED.3IONS-SCNC: 7 MMOL/L (ref 8–16)
ANISOCYTOSIS BLD QL SMEAR: (no result)
AST SERPL W P-5'-P-CCNC: 97 U/L (ref 10–37)
BACTERIA URNS QL MICRO: (no result) /HPF
BASOPHILS # BLD AUTO: 0 X10'3 (ref 0–0.2)
BASOPHILS NFR BLD AUTO: 0.8 % (ref 0–1)
BILIRUB SERPL-MCNC: 0.3 MG/DL (ref 0.1–1)
BUN SERPL-MCNC: 7 MG/DL (ref 7–18)
BUN/CREAT SERPL: 8.9 (ref 6.6–38)
CALCIUM SERPL-MCNC: 8.5 MG/DL (ref 8.5–10.1)
CHLORIDE SERPL-SCNC: 104 MMOL/L (ref 99–107)
CLARITY UR: (no result)
CO2 SERPL-SCNC: 25.2 MMOL/L (ref 24–32)
COLOR UR: YELLOW
CREAT SERPL-MCNC: 0.79 MG/DL (ref 0.4–0.9)
DACRYOCYTES BLD QL SMEAR: (no result)
DEPRECATED SQUAMOUS URNS QL MICRO: (no result) /LPF
EOSINOPHIL # BLD AUTO: 0.2 X10'3 (ref 0–0.9)
EOSINOPHIL NFR BLD AUTO: 3.7 % (ref 0–6)
ERYTHROCYTE [DISTWIDTH] IN BLOOD BY AUTOMATED COUNT: 22.8 % (ref 11.5–14.5)
GFR SERPL CREATININE-BSD FRML MDRD: 79 ML/MIN
GLUCOSE SERPL-MCNC: 62 MG/DL (ref 70–104)
GLUCOSE UR STRIP-MCNC: NEGATIVE MG/DL
HCT VFR BLD AUTO: 30.7 % (ref 35–45)
HGB BLD-MCNC: 9.2 G/DL (ref 12–16)
HGB UR QL STRIP: NEGATIVE
HYPOCHROMIA BLD QL SMEAR: (no result)
KETONES UR STRIP-MCNC: NEGATIVE MG/DL
LEUKOCYTE ESTERASE UR QL STRIP: (no result)
LYMPHOCYTES # BLD AUTO: 1.1 X10'3 (ref 1.1–4.8)
LYMPHOCYTES NFR BLD AUTO: 20.3 % (ref 21–51)
MCH RBC QN AUTO: 21.1 PG (ref 27–31)
MCHC RBC AUTO-ENTMCNC: 30 G/DL (ref 33–36.5)
MCV RBC AUTO: 70.3 FL (ref 78–98)
MICROCYTES BLD QL SMEAR: (no result)
MONOCYTES # BLD AUTO: 0.4 X10'3 (ref 0–0.9)
MONOCYTES NFR BLD AUTO: 7.7 % (ref 2–12)
MUCOUS THREADS URNS QL MICRO: (no result) /LPF
NEUTROPHILS # BLD AUTO: 3.6 X10'3 (ref 1.8–7.7)
NEUTROPHILS NFR BLD AUTO: 67.5 % (ref 42–75)
NITRITE UR QL STRIP: NEGATIVE
PH UR STRIP: 6 [PH] (ref 4.8–8)
PLATELET # BLD AUTO: 247 X10'3 (ref 140–440)
PLATELET BLD QL SMEAR: NORMAL
PMV BLD AUTO: 6.6 FL (ref 7.4–10.4)
POLYCHROMASIA BLD QL SMEAR: (no result)
POTASSIUM SERPL-SCNC: 3.9 MMOL/L (ref 3.5–5.1)
PROT SERPL-MCNC: 7.3 G/DL (ref 6.4–8.2)
PROT UR QL STRIP: NEGATIVE MG/DL
RBC # BLD AUTO: 4.37 X10'6 (ref 4.2–5.6)
RBC #/AREA URNS HPF: (no result) /HPF (ref 0–2)
RBC MORPH BLD: (no result)
SODIUM SERPL-SCNC: 136 MMOL/L (ref 135–145)
SP GR UR STRIP: 1.02 (ref 1–1.03)
URN COLLECT METHOD CLASS: (no result)
UROBILINOGEN UR STRIP-MCNC: 1 E.U/DL (ref 0.2–1)
WBC # BLD AUTO: 5.4 X10'3 (ref 4.5–11)
WBC #/AREA URNS HPF: (no result) /HPF (ref 0–4)

## 2020-07-31 PROCEDURE — 85025 COMPLETE CBC W/AUTO DIFF WBC: CPT

## 2020-07-31 PROCEDURE — 99283 EMERGENCY DEPT VISIT LOW MDM: CPT

## 2020-07-31 PROCEDURE — 81001 URINALYSIS AUTO W/SCOPE: CPT

## 2020-07-31 PROCEDURE — 80053 COMPREHEN METABOLIC PANEL: CPT

## 2020-07-31 PROCEDURE — 87088 URINE BACTERIA CULTURE: CPT

## 2020-07-31 PROCEDURE — 36415 COLL VENOUS BLD VENIPUNCTURE: CPT

## 2021-05-21 ENCOUNTER — HOSPITAL ENCOUNTER (EMERGENCY)
Dept: HOSPITAL 94 - ER | Age: 46
Discharge: HOME | End: 2021-05-21
Payer: MEDICAID

## 2021-05-21 VITALS — WEIGHT: 190.39 LBS | BODY MASS INDEX: 35.04 KG/M2 | HEIGHT: 62 IN

## 2021-05-21 VITALS — SYSTOLIC BLOOD PRESSURE: 132 MMHG | DIASTOLIC BLOOD PRESSURE: 82 MMHG

## 2021-05-21 DIAGNOSIS — Z88.6: ICD-10-CM

## 2021-05-21 DIAGNOSIS — R06.02: Primary | ICD-10-CM

## 2021-05-21 DIAGNOSIS — Z86.2: ICD-10-CM

## 2021-05-21 DIAGNOSIS — Z98.890: ICD-10-CM

## 2021-05-21 DIAGNOSIS — Z90.49: ICD-10-CM

## 2021-05-21 DIAGNOSIS — Z79.2: ICD-10-CM

## 2021-05-21 DIAGNOSIS — Z72.89: ICD-10-CM

## 2021-05-21 DIAGNOSIS — Z79.899: ICD-10-CM

## 2021-05-21 DIAGNOSIS — R05: ICD-10-CM

## 2021-05-21 DIAGNOSIS — Z88.8: ICD-10-CM

## 2021-05-21 DIAGNOSIS — F32.9: ICD-10-CM

## 2021-05-21 DIAGNOSIS — Z86.19: ICD-10-CM

## 2021-05-21 DIAGNOSIS — F41.9: ICD-10-CM

## 2021-05-21 LAB
ALBUMIN SERPL BCP-MCNC: 2.9 G/DL (ref 3.4–5)
ALBUMIN/GLOB SERPL: 0.6 {RATIO} (ref 1.1–1.5)
ALP SERPL-CCNC: 431 IU/L (ref 46–116)
ALT SERPL W P-5'-P-CCNC: 151 U/L (ref 12–78)
ANION GAP SERPL CALCULATED.3IONS-SCNC: 7 MMOL/L (ref 8–16)
ANISOCYTOSIS BLD QL SMEAR: (no result)
AST SERPL W P-5'-P-CCNC: 559 U/L (ref 10–37)
BASOPHILS # BLD AUTO: 0 X10'3 (ref 0–0.2)
BASOPHILS NFR BLD AUTO: 1.1 % (ref 0–1)
BILIRUB SERPL-MCNC: 0.6 MG/DL (ref 0.1–1)
BUN SERPL-MCNC: 3 MG/DL (ref 7–18)
BUN/CREAT SERPL: 4.2 (ref 6.6–38)
CALCIUM SERPL-MCNC: 8 MG/DL (ref 8.5–10.1)
CHLORIDE SERPL-SCNC: 105 MMOL/L (ref 99–107)
CO2 SERPL-SCNC: 30 MMOL/L (ref 24–32)
CREAT SERPL-MCNC: 0.71 MG/DL (ref 0.4–0.9)
EOSINOPHIL # BLD AUTO: 0.4 X10'3 (ref 0–0.9)
EOSINOPHIL NFR BLD AUTO: 10.5 % (ref 0–6)
ERYTHROCYTE [DISTWIDTH] IN BLOOD BY AUTOMATED COUNT: 22.6 % (ref 11.5–14.5)
GFR SERPL CREATININE-BSD FRML MDRD: 89 ML/MIN
GLUCOSE SERPL-MCNC: 83 MG/DL (ref 70–104)
HCT VFR BLD AUTO: 31.9 % (ref 35–45)
HGB BLD-MCNC: 9.8 G/DL (ref 12–16)
HYPOCHROMIA BLD QL SMEAR: (no result)
LYMPHOCYTES # BLD AUTO: 1.3 X10'3 (ref 1.1–4.8)
LYMPHOCYTES NFR BLD AUTO: 33.7 % (ref 21–51)
MCH RBC QN AUTO: 23.5 PG (ref 27–31)
MCHC RBC AUTO-ENTMCNC: 30.7 G/DL (ref 33–36.5)
MCV RBC AUTO: 76.6 FL (ref 78–98)
MICROCYTES BLD QL SMEAR: (no result)
MONOCYTES # BLD AUTO: 0.4 X10'3 (ref 0–0.9)
MONOCYTES NFR BLD AUTO: 10.3 % (ref 2–12)
NEUTROPHILS # BLD AUTO: 1.8 X10'3 (ref 1.8–7.7)
NEUTROPHILS NFR BLD AUTO: 44.4 % (ref 42–75)
PLATELET # BLD AUTO: 263 X10'3 (ref 140–440)
PLATELET BLD QL SMEAR: NORMAL
PMV BLD AUTO: 7 FL (ref 7.4–10.4)
POLYCHROMASIA BLD QL SMEAR: (no result)
POTASSIUM SERPL-SCNC: 3.7 MMOL/L (ref 3.5–5.1)
PROT SERPL-MCNC: 7.4 G/DL (ref 6.4–8.2)
RBC # BLD AUTO: 4.16 X10'6 (ref 4.2–5.6)
RBC MORPH BLD: (no result)
SODIUM SERPL-SCNC: 142 MMOL/L (ref 135–145)
WBC # BLD AUTO: 4 X10'3 (ref 4.5–11)

## 2021-05-21 PROCEDURE — 85025 COMPLETE CBC W/AUTO DIFF WBC: CPT

## 2021-05-21 PROCEDURE — 87040 BLOOD CULTURE FOR BACTERIA: CPT

## 2021-05-21 PROCEDURE — 96375 TX/PRO/DX INJ NEW DRUG ADDON: CPT

## 2021-05-21 PROCEDURE — 85008 BL SMEAR W/O DIFF WBC COUNT: CPT

## 2021-05-21 PROCEDURE — 94760 N-INVAS EAR/PLS OXIMETRY 1: CPT

## 2021-05-21 PROCEDURE — 94640 AIRWAY INHALATION TREATMENT: CPT

## 2021-05-21 PROCEDURE — 80053 COMPREHEN METABOLIC PANEL: CPT

## 2021-05-21 PROCEDURE — 93005 ELECTROCARDIOGRAM TRACING: CPT

## 2021-05-21 PROCEDURE — 83880 ASSAY OF NATRIURETIC PEPTIDE: CPT

## 2021-05-21 PROCEDURE — 71046 X-RAY EXAM CHEST 2 VIEWS: CPT

## 2021-05-21 PROCEDURE — 96374 THER/PROPH/DIAG INJ IV PUSH: CPT

## 2021-05-21 PROCEDURE — 83605 ASSAY OF LACTIC ACID: CPT

## 2021-05-21 PROCEDURE — 99285 EMERGENCY DEPT VISIT HI MDM: CPT

## 2021-05-21 PROCEDURE — 36415 COLL VENOUS BLD VENIPUNCTURE: CPT

## 2021-11-05 ENCOUNTER — HOSPITAL ENCOUNTER (EMERGENCY)
Dept: HOSPITAL 94 - ER | Age: 46
Discharge: HOME | End: 2021-11-05
Payer: MEDICAID

## 2021-11-05 VITALS — WEIGHT: 190.39 LBS | HEIGHT: 62 IN | BODY MASS INDEX: 35.04 KG/M2

## 2021-11-05 VITALS — DIASTOLIC BLOOD PRESSURE: 85 MMHG | SYSTOLIC BLOOD PRESSURE: 119 MMHG

## 2021-11-05 DIAGNOSIS — Z72.89: ICD-10-CM

## 2021-11-05 DIAGNOSIS — U07.1: Primary | ICD-10-CM

## 2021-11-05 DIAGNOSIS — F32.9: ICD-10-CM

## 2021-11-05 DIAGNOSIS — R11.0: ICD-10-CM

## 2021-11-05 DIAGNOSIS — Z79.899: ICD-10-CM

## 2021-11-05 DIAGNOSIS — F17.200: ICD-10-CM

## 2021-11-05 DIAGNOSIS — Z98.890: ICD-10-CM

## 2021-11-05 DIAGNOSIS — R50.9: ICD-10-CM

## 2021-11-05 DIAGNOSIS — Z88.6: ICD-10-CM

## 2021-11-05 DIAGNOSIS — L30.4: ICD-10-CM

## 2021-11-05 DIAGNOSIS — R06.02: ICD-10-CM

## 2021-11-05 DIAGNOSIS — Z86.2: ICD-10-CM

## 2021-11-05 DIAGNOSIS — Z90.49: ICD-10-CM

## 2021-11-05 DIAGNOSIS — Z88.8: ICD-10-CM

## 2021-11-05 DIAGNOSIS — F41.9: ICD-10-CM

## 2021-11-05 DIAGNOSIS — Z79.2: ICD-10-CM

## 2021-11-05 DIAGNOSIS — J44.9: ICD-10-CM

## 2021-11-05 DIAGNOSIS — Z86.19: ICD-10-CM

## 2021-11-05 LAB
ALBUMIN SERPL BCP-MCNC: 2.6 G/DL (ref 3.4–5)
ANION GAP SERPL CALCULATED.3IONS-SCNC: 7 MMOL/L (ref 8–16)
ANISOCYTOSIS BLD QL SMEAR: (no result)
BASOPHILS # BLD AUTO: 0 X10'3 (ref 0–0.2)
BASOPHILS NFR BLD AUTO: 0.5 % (ref 0–1)
BUN SERPL-MCNC: 5 MG/DL (ref 7–18)
BUN/CREAT SERPL: 7.6 (ref 6.6–38)
CALCIUM SERPL-MCNC: 8.3 MG/DL (ref 8.5–10.1)
CHLORIDE SERPL-SCNC: 106 MMOL/L (ref 99–107)
CO2 SERPL-SCNC: 28.8 MMOL/L (ref 24–32)
CREAT SERPL-MCNC: 0.66 MG/DL (ref 0.4–0.9)
DACRYOCYTES BLD QL SMEAR: (no result)
EOSINOPHIL # BLD AUTO: 0 X10'3 (ref 0–0.9)
EOSINOPHIL NFR BLD AUTO: 1.3 % (ref 0–6)
ERYTHROCYTE [DISTWIDTH] IN BLOOD BY AUTOMATED COUNT: 23.5 % (ref 11.5–14.5)
GFR SERPL CREATININE-BSD FRML MDRD: > 90 ML/MIN
GLUCOSE SERPL-MCNC: 93 MG/DL (ref 70–104)
HCT VFR BLD AUTO: 31 % (ref 35–45)
HGB BLD-MCNC: 9.8 G/DL (ref 12–16)
HYPOCHROMIA BLD QL SMEAR: (no result)
LYMPHOCYTES # BLD AUTO: 0.5 X10'3 (ref 1.1–4.8)
LYMPHOCYTES NFR BLD AUTO: 13.1 % (ref 21–51)
MCH RBC QN AUTO: 23.9 PG (ref 27–31)
MCHC RBC AUTO-ENTMCNC: 31.6 G/DL (ref 33–36.5)
MCV RBC AUTO: 75.8 FL (ref 78–98)
MICROCYTES BLD QL SMEAR: (no result)
MONOCYTES # BLD AUTO: 0.4 X10'3 (ref 0–0.9)
MONOCYTES NFR BLD AUTO: 10.7 % (ref 2–12)
NEUTROPHILS # BLD AUTO: 2.8 X10'3 (ref 1.8–7.7)
NEUTROPHILS NFR BLD AUTO: 74.4 % (ref 42–75)
PLATELET # BLD AUTO: 228 X10'3 (ref 140–440)
PLATELET BLD QL SMEAR: NORMAL
PMV BLD AUTO: 6.8 FL (ref 7.4–10.4)
POLYCHROMASIA BLD QL SMEAR: (no result)
POTASSIUM SERPL-SCNC: 4 MMOL/L (ref 3.5–5.1)
RBC # BLD AUTO: 4.09 X10'6 (ref 4.2–5.6)
RBC MORPH BLD: (no result)
SCHISTOCYTES BLD QL SMEAR: (no result)
SODIUM SERPL-SCNC: 142 MMOL/L (ref 135–145)
WBC # BLD AUTO: 3.7 X10'3 (ref 4.5–11)

## 2021-11-05 PROCEDURE — 85025 COMPLETE CBC W/AUTO DIFF WBC: CPT

## 2021-11-05 PROCEDURE — 36415 COLL VENOUS BLD VENIPUNCTURE: CPT

## 2021-11-05 PROCEDURE — 84145 PROCALCITONIN (PCT): CPT

## 2021-11-05 PROCEDURE — 87635 SARS-COV-2 COVID-19 AMP PRB: CPT

## 2021-11-05 PROCEDURE — 80048 BASIC METABOLIC PNL TOTAL CA: CPT

## 2021-11-05 PROCEDURE — 99284 EMERGENCY DEPT VISIT MOD MDM: CPT

## 2021-11-05 PROCEDURE — 85008 BL SMEAR W/O DIFF WBC COUNT: CPT

## 2021-11-05 PROCEDURE — 71045 X-RAY EXAM CHEST 1 VIEW: CPT

## 2022-06-19 ENCOUNTER — HOSPITAL ENCOUNTER (INPATIENT)
Dept: HOSPITAL 94 - ER | Age: 47
LOS: 10 days | Discharge: HOME | DRG: 751 | End: 2022-06-29
Attending: PSYCHIATRY & NEUROLOGY | Admitting: PSYCHIATRY & NEUROLOGY
Payer: MEDICAID

## 2022-06-19 VITALS — WEIGHT: 215.83 LBS | HEIGHT: 62 IN | BODY MASS INDEX: 39.72 KG/M2

## 2022-06-19 DIAGNOSIS — D51.0: ICD-10-CM

## 2022-06-19 DIAGNOSIS — E11.9: ICD-10-CM

## 2022-06-19 DIAGNOSIS — K21.9: ICD-10-CM

## 2022-06-19 DIAGNOSIS — I10: ICD-10-CM

## 2022-06-19 DIAGNOSIS — Z88.8: ICD-10-CM

## 2022-06-19 DIAGNOSIS — B19.20: ICD-10-CM

## 2022-06-19 DIAGNOSIS — F10.20: ICD-10-CM

## 2022-06-19 DIAGNOSIS — E87.6: ICD-10-CM
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
## 2022-06-19 DIAGNOSIS — Z63.8: ICD-10-CM

## 2022-06-19 DIAGNOSIS — Z98.84: ICD-10-CM

## 2022-06-19 DIAGNOSIS — Z79.899: ICD-10-CM

## 2022-06-19 DIAGNOSIS — Z62.810: ICD-10-CM

## 2022-06-19 DIAGNOSIS — Z91.52: ICD-10-CM

## 2022-06-19 DIAGNOSIS — Z90.49: ICD-10-CM

## 2022-06-19 DIAGNOSIS — F41.9: ICD-10-CM

## 2022-06-19 DIAGNOSIS — Z85.118: ICD-10-CM

## 2022-06-19 DIAGNOSIS — R45.851: ICD-10-CM

## 2022-06-19 DIAGNOSIS — F17.210: ICD-10-CM

## 2022-06-19 DIAGNOSIS — Z86.73: ICD-10-CM

## 2022-06-19 DIAGNOSIS — J44.9: ICD-10-CM

## 2022-06-19 DIAGNOSIS — Z20.822: ICD-10-CM

## 2022-06-19 DIAGNOSIS — F33.2: Primary | ICD-10-CM

## 2022-06-19 DIAGNOSIS — F15.20: ICD-10-CM

## 2022-06-19 DIAGNOSIS — F11.20: ICD-10-CM

## 2022-06-19 LAB
ALBUMIN SERPL BCP-MCNC: 3 G/DL (ref 3.4–5)
ALBUMIN/GLOB SERPL: 0.7 {RATIO} (ref 1.1–1.5)
ALP SERPL-CCNC: 443 IU/L (ref 46–116)
ALT SERPL W P-5'-P-CCNC: 85 U/L (ref 12–78)
AMPHETAMINES UR QL SCN: NEGATIVE
ANION GAP SERPL CALCULATED.3IONS-SCNC: 14 MMOL/L (ref 8–16)
ANISOCYTOSIS BLD QL SMEAR: (no result)
APAP SERPL-MCNC: < 2 UG/ML (ref 10–30)
AST SERPL W P-5'-P-CCNC: 202 U/L (ref 10–37)
BARBITURATES UR QL SCN: NEGATIVE
BASOPHILS # BLD AUTO: 0 X10'3 (ref 0–0.2)
BASOPHILS NFR BLD AUTO: 0.3 % (ref 0–1)
BENZODIAZ UR QL SCN: POSITIVE
BILIRUB SERPL-MCNC: 0.9 MG/DL (ref 0.1–1)
BUN SERPL-MCNC: 3 MG/DL (ref 7–18)
BUN/CREAT SERPL: 3.7 (ref 6.6–38)
BZE UR QL SCN: NEGATIVE
CALCIUM SERPL-MCNC: 8.8 MG/DL (ref 8.5–10.1)
CANNABINOIDS UR QL SCN: POSITIVE
CHLORIDE SERPL-SCNC: 101 MMOL/L (ref 99–107)
CO2 SERPL-SCNC: 26.8 MMOL/L (ref 24–32)
CREAT SERPL-MCNC: 0.81 MG/DL (ref 0.4–0.9)
DACRYOCYTES BLD QL SMEAR: (no result)
EOSINOPHIL # BLD AUTO: 0.1 X10'3 (ref 0–0.9)
EOSINOPHIL NFR BLD AUTO: 2.1 % (ref 0–6)
ERYTHROCYTE [DISTWIDTH] IN BLOOD BY AUTOMATED COUNT: 33.6 % (ref 11.5–14.5)
ETHANOL SERPL-MCNC: 0.08 GM/DL (ref 0–0.01)
GFR SERPL CREATININE-BSD FRML MDRD: 76 ML/MIN
GLUCOSE SERPL-MCNC: 111 MG/DL (ref 70–104)
HCT VFR BLD AUTO: 37.4 % (ref 35–45)
HGB BLD-MCNC: 12 G/DL (ref 12–16)
HYPOCHROMIA BLD QL SMEAR: (no result)
LYMPHOCYTES # BLD AUTO: 1.1 X10'3 (ref 1.1–4.8)
LYMPHOCYTES NFR BLD AUTO: 17.1 % (ref 21–51)
MCH RBC QN AUTO: 29.5 PG (ref 27–31)
MCHC RBC AUTO-ENTMCNC: 32.1 G/DL (ref 33–36.5)
MCV RBC AUTO: 91.7 FL (ref 78–98)
METHADONE UR QL SCN: POSITIVE
MICROCYTES BLD QL SMEAR: (no result)
MONOCYTES # BLD AUTO: 0.6 X10'3 (ref 0–0.9)
MONOCYTES NFR BLD AUTO: 9.5 % (ref 2–12)
NEUTROPHILS # BLD AUTO: 4.5 X10'3 (ref 1.8–7.7)
NEUTROPHILS NFR BLD AUTO: 71 % (ref 42–75)
OPIATES UR QL SCN: NEGATIVE
PCP UR QL SCN: NEGATIVE
PLATELET # BLD AUTO: 162 X10'3 (ref 140–440)
PLATELET BLD QL SMEAR: NORMAL
PMV BLD AUTO: 8 FL (ref 7.4–10.4)
POLYCHROMASIA BLD QL SMEAR: (no result)
POTASSIUM SERPL-SCNC: 2.9 MMOL/L (ref 3.5–5.1)
PROT SERPL-MCNC: 7.4 G/DL (ref 6.4–8.2)
RBC # BLD AUTO: 4.08 X10'6 (ref 4.2–5.6)
RBC MORPH BLD: (no result)
SODIUM SERPL-SCNC: 142 MMOL/L (ref 135–145)
STOMATOCYTES BLD QL SMEAR: (no result)
WBC # BLD AUTO: 6.3 X10'3 (ref 4.5–11)

## 2022-06-19 PROCEDURE — 85025 COMPLETE CBC W/AUTO DIFF WBC: CPT

## 2022-06-19 PROCEDURE — 36415 COLL VENOUS BLD VENIPUNCTURE: CPT

## 2022-06-19 PROCEDURE — 84443 ASSAY THYROID STIM HORMONE: CPT

## 2022-06-19 PROCEDURE — 80061 LIPID PANEL: CPT

## 2022-06-19 PROCEDURE — 96365 THER/PROPH/DIAG IV INF INIT: CPT

## 2022-06-19 PROCEDURE — 87081 CULTURE SCREEN ONLY: CPT

## 2022-06-19 PROCEDURE — 85008 BL SMEAR W/O DIFF WBC COUNT: CPT

## 2022-06-19 PROCEDURE — 83036 HEMOGLOBIN GLYCOSYLATED A1C: CPT

## 2022-06-19 PROCEDURE — 80329 ANALGESICS NON-OPIOID 1 OR 2: CPT

## 2022-06-19 PROCEDURE — 80053 COMPREHEN METABOLIC PANEL: CPT

## 2022-06-19 PROCEDURE — 84132 ASSAY OF SERUM POTASSIUM: CPT

## 2022-06-19 PROCEDURE — 96366 THER/PROPH/DIAG IV INF ADDON: CPT

## 2022-06-19 PROCEDURE — 99285 EMERGENCY DEPT VISIT HI MDM: CPT

## 2022-06-19 PROCEDURE — 80320 DRUG SCREEN QUANTALCOHOLS: CPT

## 2022-06-19 PROCEDURE — 96368 THER/DIAG CONCURRENT INF: CPT

## 2022-06-19 PROCEDURE — 93005 ELECTROCARDIOGRAM TRACING: CPT

## 2022-06-19 PROCEDURE — 80305 DRUG TEST PRSMV DIR OPT OBS: CPT

## 2022-06-19 SDOH — SOCIAL STABILITY - SOCIAL INSECURITY: OTHER SPECIFIED PROBLEMS RELATED TO PRIMARY SUPPORT GROUP: Z63.8

## 2022-06-20 RX ADMIN — PANTOPRAZOLE SODIUM SCH MG: 40 TABLET, DELAYED RELEASE ORAL at 07:30

## 2022-06-20 RX ADMIN — Medication SCH UNIT: at 18:54

## 2022-06-20 RX ADMIN — Medication SCH UNIT: at 21:00

## 2022-06-20 NOTE — NUR
CIWA paperwork inititated at 1600, pt. states she has been a chronic alcoholic 
for years and had her last drink on 6/19 at 3am. Pt scored a 9 on CIWA. One 
time orde for Ativan 1mg obtained. /77 p. 91 r. 16 O2 98% RA, zero pain.

## 2022-06-20 NOTE — NUR
PT STATED SHE HAS AN OVER THE PHONE COUNSELING SESSION WITH [facility] AT 11 AM TOMORROW MORNING. PT ALSO STATED SHE NEEDS THE METHADONE 
BOTTLES TO BE SAVED WITH HER POSESSIONS TO TURN BACK INTO THE TREATMENT CENTER. 
PT IS RESTING COMFORTABLY IN BED AND HAS FINISHED HER DINNER.

## 2022-06-20 NOTE — NUR
PT CIWA SCORE OF 0. PT STATES SHE FEELS MUCH BETTER AND DENIES ANY NAUSEA, 
HEADACHE, TREMORS, OR SWEATING. PT SLEEPING COMFORTABLY ON HER BACK. EQUAL RISE 
AND FALL OF CHEST NOTED

## 2022-06-20 NOTE — NUR
Nurse spoke with the pharmacist who spoke with [facility]. [facility] 
informed pharmacist the pt. should have enough methadone to last her till 
Friday. This nurse spoke with the pt again regarding her methadone order and 
pt. changed her story, stating her roomate will be bringing in the remainder of 
her methadone for the week. Pt. continues to have SI, stating she would consume 
her psych medications.

## 2022-06-20 NOTE — NUR
PT AWAKE, STATING SHE IS ANXIOUS AND SAD, REQUESTING ANOTHER ATIVAN. CONSULTED 
WITH DR. DURBIN WHO STATED VERBAL ORDER FOR 1 MG ATIVAN PO

## 2022-06-20 NOTE — NUR
PT HAD VISITOR AT BEDSIDE FOR 10 MINUTES. VISITOR LEFT AND WAS GIVEN OVERFLOW 
PHONE NUMBER TO CALL THE PT LATER

## 2022-06-20 NOTE — NUR
PT SITTING UP IN BED STATING SHE IS FEELING ANXIOUS. CIWA SCORE IS 6. RN WILL 
CONSULT WITH DOCTOR ON TREATMENT FOR ANXIETY

## 2022-06-20 NOTE — NUR
PATIENT SEBASTIANWA SCORE A 3. PT STATED SHE FELT MUCH BETTER AFTER GETTING HER NORMAL 
DAILY DOSE OF METHADONE

## 2022-06-20 NOTE — NUR
Nurse received pt. from main ER at 1500. Pt alert and sitting up in bed 
requesting her methadone medication. Pt receives a week supply of methadone 
from [facility] and administers her medications herself. Pharmacy has her single 
dose for today and per pt. [facility] will be deliverying the remained of her week 
supply tomorrow.

## 2022-06-21 VITALS — DIASTOLIC BLOOD PRESSURE: 65 MMHG | SYSTOLIC BLOOD PRESSURE: 116 MMHG

## 2022-06-21 RX ADMIN — PANTOPRAZOLE SODIUM SCH MG: 40 TABLET, DELAYED RELEASE ORAL at 08:23

## 2022-06-21 RX ADMIN — Medication SCH UNIT: at 20:32

## 2022-06-21 NOTE — NUR
PATIENT AWOKE FOR BREAKFAST AND IS NOTED SITTING IN HER ROOM WITH A VISITOR AT 
BEDSIDE AT THIS TIME. SHE IS A&O X4, CALM AND COOPERATIVE. PATIENT WAS 
RECEPTIVE TO SCHEDULED MEDICATION. NO COMPLAINTS OR S/S OF DISTRESS NOTED. 
PATIENTS PERSONAL METHADONE BROUGHT IN BY VISITOR AND IS BEING STORED IN THE 
PHARMACY. WILL CONTINUE TO MONITOR.

## 2022-06-21 NOTE — NUR
PATIENT RECEIVED SLEEPING SUPINE IN HER ROOM AT SHIFT CHANGE. NOTED RISE AND 
FALL OF CHEST. NO S/S OF DISTRESS. WILL CONTINUE TO MONITOR.

## 2022-06-21 NOTE — NUR
Met with patient in regards to her provider for methadone and patient wanted to 
discuss alcohol use and what can be taken to help cravings. I talked to patient 
about Naltrexone. I gave patient my card to call me with any questions.

## 2022-06-21 NOTE — NUR
PATIENT IS NOTED SLEEPING AT THIS TIME. RESPIRATIONS EVEN, UNLABORED. NO S/S OF 
DISTRESS. WILL CONTINUE TO MONITOR.

## 2022-06-21 NOTE — NUR
PATIENT SITTING UP IN BED TAPPING FOOT. PT STATES SHE FEELS ANXIOUS. RN BROUGHT 
PATIENT SOME ICE WATER AND PT SAID SHE WAS GOING TO TRY TO GO BACK TO SLEEP. 
CIWA SCORE OF 3

## 2022-06-21 NOTE — NUR
PT SITTING UP IN BED WHILE TECH TAKES VITALS. PT IS IN NO APPARENT DISTRESS AND 
IS COOPERATING WITH TECH

## 2022-06-21 NOTE — NUR
PT CAME UP TO NURSE TO REQUEST ATIVAN. THIS RN STATED THAT THERE IS NONE 
ORDERED AND THAT WE WOULD SEE HOW SHE IS FEELING AFTER HER NIGHTTIME 
MEDICATIONS. PT WALKED TO HER BED AND BEGAN LOUDLY CRYING. RN TRIED THERAPEUTIC 
COMMUNICATION BUT PT IS UPSET THAT SHE CANNOT HAVE ATIVAN RIGHT IN THIS MOMENT.



PT'S ROOMMATE, SERINA, CALLED AT THIS TIME AND SHE STATED SHE DID NOT WANT TO TALK 
TO HIM UNTIL MORNING.

## 2022-06-21 NOTE — NUR
PT IMMEDIATELY STOPPED CRYING AND LAID IN BED QUIETLY AFTER RECEIVING THE 
ATIVAN. 



THIS RN SPOKE WITH [facility] WHO STATES THAT THEY WILL BE MOVING HER 
UPSTAIRS THIS EVENING.



PT UP TO USE RESTROOM WHILE RN IS WRITING THIS NOTE

## 2022-06-21 NOTE — NUR
PATIENT IS OBSERVED SLEEPING SUPINE IN HER ROOM AT THIS TIME. RESPIRATIONS 
EVEN, UNLABORED. WILL CONTINUE TO MONITOR.

## 2022-06-21 NOTE — NUR
INITIAL [facility] SCORING OF A "9" BY PREVIOUS NURSE WAS INCORRECT. SWEATING SCORE 
SHOULD HAVE BEEN A "1" NOT A "2." INITIAL SCORE SHOULD HAVE BEEN AN "8." 8 
HOURS OF HOURLY VITALS CHARTING HAVE ALREADY BEEN COMPLETED. RN WILL NOW START 
TAKING VITALS Q4H RATHER THAN Q2H IN RESPONSE TO AMMENDED INITIAL SCORE OF "8."

## 2022-06-21 NOTE — NUR
Admission Note:



47 year old female on a 5150 for DTS, was seen by [facility] Crisis Response Team and she 
has a plan to OD on her medications and had gathered them in preparation.  She is a patient 
of Dr. Vaughan and attempted to make an appointment but was unable to be seen until October.  
Current stressors include her health issues which are effecting her daily life and feeling 
like she isn't getting the help she needs for her medical issues and difficulty getting to 
appointments due to no transportation.  



Pt brought to the unit via wheelchair by BALA Arboleda, safety check completed by Robles MCKENNA, skin 
check completed by myself and Tiara WALLER.  



She has a history of Major Depression and anxiety, hx of heroin abuse(hasn't used for years) 
and meth (last used 7 months ago).  Does report daily drinking 5 to 6 [employer]ahn Hard Lemonade, 
she denies any history of withdrawal or DT's. She is on Methadone thru [facility], 113 mg daily.  Medical issues include Hep C, COPD, hx of gastric bypass.

## 2022-06-21 NOTE — NUR
IV TO RIGHT WRIST REMOVED WITH NO COMPLICATIONS. CANNULA TIP INTACT. NO REDNESS 
OR SWELLING NOTED. PATIENT TOLERATED WELL WITH NO C/O PAIN.

## 2022-06-21 NOTE — NUR
THIS WRITER CONTACTED [facility] IN [place] REGARDING PATIENT'S 
MATHADONE REFILL. PATIENT HAS A THREE DAY SUPPLY OF LIQUID METHADONE BEING 
STORED IN THE PHARMACY. PATIENT UNABLE TO GO TO [facility] TO PICKUP REFILLS THIS 
FRIDAY 6/24/22 AS PREVIOUSLY COORDINATED. THIS WRITER SPOKE WITH DR. HAYES 
REGARDING SWITCHING PATIENT FROM LIQUID METHADONE TO TABLET FORM. HOWEVER, OUR 
PHARMACY DOES NOT CARRY LIQUID OR TABLET METHADONE IN THE PRESCRIBED DOSAGE 
THAT PATIENT REQUIRES. THIS WRITER ATTEMPTED TO CALL [facility] BACK BUT THEY ARE 
CLOSED FOR THE DAY. WILL RELAY INFORMATION TO ONCOMING NURSE. [facility] PHONE 
NUMBER [phone].

## 2022-06-21 NOTE — NUR
PATIENT IS OBSERVED SITTING IN HER ROOM WITH A VISITOR AT BEDSIDE. PATIENT GAVE 
HER VISITOR $19 CASH AND HER CELLPHONE OUT OF HER PERSONAL BELONGINGS FOR HER 
VISITOR TO TAKE HOME. SHE HAS NO COMPLAINTS OR S/S OF DISTRESS NOTED. WILL 
CONTINUE TO MONITOR.

## 2022-06-21 NOTE — NUR
PATIENT WAS RECEPTIVE TO 1:1 ASSESSMENT. SHE ENDORSED FEELING "SAD" AND "NOT 
HAPPY TO BE ALIVE". PATIENT REPORTING PASSIVE SI WITH NO PLAN NOTED. SHE IS 
OBSERVED RESTING IN HER ROOM AT THIS TIME WITH RISE AND FALL OF CHEST NOTED. NO 
CHANGES OR COMPLAINTS MADE.

## 2022-06-21 NOTE — NUR
PT COMPLAINING OF INCREASING ANXIETY. RN SPOKE WITH CIELO TOWNSEND AND RECIEVED 
VERBAL ORDER FOR 1 MG PO ATIVAN ONCE

## 2022-06-22 VITALS — DIASTOLIC BLOOD PRESSURE: 67 MMHG | SYSTOLIC BLOOD PRESSURE: 112 MMHG

## 2022-06-22 VITALS — DIASTOLIC BLOOD PRESSURE: 66 MMHG | SYSTOLIC BLOOD PRESSURE: 104 MMHG

## 2022-06-22 LAB
CHOLEST SERPL-MCNC: 152 MG/DL (ref 0–200)
CHOLEST/HDLC SERPL: 4.6 {RATIO} (ref 0–4.99)
HBA1C MFR BLD: 4.6 % (ref 4.5–6.2)
HDLC SERPL-MCNC: 33 MG/DL (ref 35–60)
LDLC SERPL DIRECT ASSAY-MCNC: 80 MG/DL (ref 50–100)
TRIGL SERPL-MCNC: 69 MG/DL (ref 20–135)

## 2022-06-22 RX ADMIN — PANTOPRAZOLE SODIUM SCH MG: 40 TABLET, DELAYED RELEASE ORAL at 07:43

## 2022-06-22 RX ADMIN — METHADONE HYDROCHLORIDE SCH MG: 10 TABLET ORAL at 18:08

## 2022-06-22 RX ADMIN — MAGNESIUM HYDROXIDE PRN ML: 400 SUSPENSION ORAL at 18:16

## 2022-06-22 NOTE — NUR
CM-Continuity of Care 



Presenting Issues: 

Pt's a new admit to [facility], per chart review-she receives Methadone treatment via [facility] and has 
been cleared by [facility] to receive Methadone tablet while inpatient. Pt's current meds did not 
include Methadone tablet. 



Interventions: 

Clinician notified attending physician & care team. Per consultation, it was discovered that 
the ER nurse had entered the wrong form when she ordered the Methadone for pt.  [facility] Ch RN 
will make the corrections. 



Plan: 

Clinician will meet w/pt to complete Assessment. 

Nalini Humphrey LCSW

-------------------------------------------------------------------------------

Addendum: 06/22/22 at 0923 by Nalini Humphrey SS

-------------------------------------------------------------------------------

Amended: Links added.

## 2022-06-22 NOTE — NUR
Assessment



Presenting Issues: 

Pt admitted to [facility] following 5150 due to increasing depression w/SI and plan to OD. 



Interventions: 

Clinician met w/pt and engaged her in completing a psychosocial assessment and treatment 
plan.  Per assessment: 

MSE:

TP- linear

TC-denies SI/HI/AH/VH/TH; no delusional statements noted; perseverates on fear of going 
overboard w/alcohol consumption

Mood- depressed & anxious 

Affect- labile- tearful & flat 

Bxs- cooperative

Insight-guarded (pt demonstrate insight into her illness however, lacks insight into her own 
agency to mitigate the negative impact of her illness on her day-day life)

Judgement- fair-guarded (can be impulsive)



In addition, pt also endorse some character traits associated w/Borderline & Histrionic 
personality disorders. 

Pt is also receiving MAT services [facility] and has weekly Addiction Counseling 
services. 



Plan: 

Pt meets criteria for continued inpatient psychiatric care to stabilize her mood and 
mitigate risks associated w/DTS concerns. 

Nalini Humphrey LCSW


-------------------------------------------------------------------------------

Addendum: 06/23/22 at 1126 by Nalini Humphrey SS

-------------------------------------------------------------------------------

Amended: Links added.

## 2022-06-22 NOTE — NUR
Nursing Progress Note: Mayuri



Problem: 

Patient has a plan to OD on her medications and had gathered them in preparation.  She is a 
patient of Dr. Vaughan and attempted to make an appointment but was unable to be seen until 
October.  Current stressors include her health issues which are effecting her daily life and 
feeling like she isn't getting the help she needs for her medical issues and difficulty 
getting to appointments due to no transportation.  



Intervention:  

Medication given as ordered with no adverse side effects noted.  Provided with a safe and 
therapeutic environment, clear communication, active listening and positive encouragement. 



Response:

Patient is resting quietly in bed at the start of the shift.  Patient is cooperative with 
assessment and medications.  She c/o anxiety relieved by PRN Ativan.  Patient isolates to 
her room most of the shift and appears guarded.  Endorses suicidal ideation with a plan to 
OD on her medication.  Current stressors include health problems that the patient appears 
overwhelmed by.  



Plan:  

Patient continues to require crisis interruption and stabilization with medication 
management and monitoring in a safe and therapeutic environment.

## 2022-06-23 VITALS — DIASTOLIC BLOOD PRESSURE: 68 MMHG | SYSTOLIC BLOOD PRESSURE: 116 MMHG

## 2022-06-23 VITALS — SYSTOLIC BLOOD PRESSURE: 109 MMHG | DIASTOLIC BLOOD PRESSURE: 67 MMHG

## 2022-06-23 RX ADMIN — METHADONE HYDROCHLORIDE SCH MG: 10 TABLET ORAL at 17:35

## 2022-06-23 RX ADMIN — PANTOPRAZOLE SODIUM SCH MG: 40 TABLET, DELAYED RELEASE ORAL at 06:59

## 2022-06-23 RX ADMIN — MAGNESIUM HYDROXIDE PRN ML: 400 SUSPENSION ORAL at 17:47

## 2022-06-23 NOTE — NUR
Nursing Progress Note: Mayuri



Problem: 

Patient has a plan to OD on her medications and had gathered them in preparation.  She is a 
patient of Dr. Vaughan and attempted to make an appointment but was unable to be seen until 
October.  Current stressors include her health issues which are effecting her daily life and 
feeling like she isn't getting the help she needs for her medical issues and difficulty 
getting to appointments due to no transportation.  



Intervention:  

Medication given as ordered with no adverse side effects noted.  Provided with a safe and 
therapeutic environment, clear communication, active listening and positive encouragement. 



Response:

Patient in bed at the start of the shift, later came out to watch tv and attend evening 
snacks.  She denies S/I at this time, but continues to report feeling depressed and anxious. 
 Affect is congruent to her mood.  Requested PRN Trazadone for sleep which was effective. 



Plan:  

Patient continues to require crisis interruption and stabilization with medication 
management and monitoring in a safe and therapeutic environment.

## 2022-06-23 NOTE — NUR
CM-Continuity of Care



Presenting Issues: 

Pt's on Methadone & receives it from [facility]. Pt's 5150 will  on  at 9:20pm.  
Pt only has 1 dose of her home Methadone left, this increases risk for w/drawal if pt is d/c 
on the weekend. 



Interventions: 

Clinician consulted MDT per d/c concerns, per consultation, it was decided that pt should be 
offered option to sign voluntary when her 5150 expires. 



Clinician discussed this w/pt and pt was amenable to signing Vol and stay until Monday when 
[facility] will pt can access services at [facility]. 



Plan: 

pt will sign Vol when 5150 expires. 

Nalini Humphrey LCSW

-------------------------------------------------------------------------------

Addendum: 22 at 1138 by Nalini Humphrey SS

-------------------------------------------------------------------------------

Amended: Links added.

## 2022-06-23 NOTE — NUR
Nursing Progress Note: Mayuri



Problem: 

Patient has a plan to OD on her medications and had gathered them in preparation.  She is a 
patient of Dr. Vaughan and attempted to make an appointment but was unable to be seen until 
October.  Current stressors include her health issues which are effecting her daily life and 
feeling like she isn't getting the help she needs for her medical issues and difficulty 
getting to appointments due to no transportation.  



Intervention:  

Medication given as ordered with no adverse side effects noted.  Provided with a safe and 
therapeutic environment, clear communication, active listening and positive encouragement. 



Response:

Patient is resting quietly in bed at the start of the shift.  Complains of anxiety relieved 
by routine medications.  Continues to endorse suicidal ideation but contracts for safety 
while here on the unit.  Denies any homicidal ideation.  Denies auditory/ visual 
hallucinations.  Patient isolates to her room looking out the window but does come out for 
meals and to meet her needs.  Patient is Cooperative with medication and assessment.  



Plan:  

Patient continues to require crisis interruption and stabilization with medication 
management and monitoring in a safe and therapeutic environment.

## 2022-06-24 VITALS — SYSTOLIC BLOOD PRESSURE: 104 MMHG | DIASTOLIC BLOOD PRESSURE: 68 MMHG

## 2022-06-24 VITALS — SYSTOLIC BLOOD PRESSURE: 118 MMHG | DIASTOLIC BLOOD PRESSURE: 69 MMHG

## 2022-06-24 RX ADMIN — PANTOPRAZOLE SODIUM SCH MG: 40 TABLET, DELAYED RELEASE ORAL at 07:47

## 2022-06-24 RX ADMIN — METHADONE HYDROCHLORIDE SCH MG: 10 TABLET ORAL at 18:11

## 2022-06-24 NOTE — NUR
Nursing Progress Note: 



Problem: 

Patient has a plan to OD on her medications and had gathered them in preparation.  She is a 
patient of Dr. Vaughan and attempted to make an appointment but was unable to be seen until 
October.  Current stressors include her health issues which are effecting her daily life and 
feeling like she isn't getting the help she needs for her medical issues and difficulty 
getting to appointments due to no transportation.  



Intervention:  

Medication given as ordered with no adverse side effects noted.  Provided with a safe and 
therapeutic environment, clear communication, active listening and positive encouragement. 



Response:

Pt lying in bed at the start of the shift, asked to take a shower, which she did, and then 
later was watching tv with peers.  She reports feeling somewhat better today, more hopeful 
after meeting with the Dr today and discussing medications to help with her depression.  She 
reports depression still there, but somewhat improved, states anxiety still significant.  
She did request and Ativan at 1940, but she no longer has PRN Ativan only scheduled, which 
she was given when it was due.  She had a phone call with her daughter which went very well. 
 Denies any thoughts of suicide this shift. 



Plan:  

Patient continues to require crisis interruption and stabilization with medication 
management and monitoring in a safe and therapeutic environment.

## 2022-06-24 NOTE — NUR
Initial: Pt admit for MDD with SI. Currently on a regular diet with fluctuating PO intake 
however overall eating well with average 81% PO intake meeting estimated nutrient needs. LBM 
6/22 documented as small. Pt receiving PRN MoM and denies GI symptoms per EMR. D/w dietary 
to send prunes and prune juice with next meal to further assist with bowel regularity. Will 
continue to follow and monitor need for additional nutrition intervention.

Recommendations:

1) Continue regular diet

2) Routine bowel care

3) Weekly scaled weights

-------------------------------------------------------------------------------

Addendum: 06/24/22 at 1051 by Rosemary Wheeler RD

-------------------------------------------------------------------------------

Amended: Links added.

## 2022-06-24 NOTE — NUR
Nursing Progress Note: 



Problem:  Patient has a plan to OD on her medications and had gathered them in preparation.  
She is a patient of Dr. Vaughan and attempted to make an appointment but was unable to be 
seen until October.  Current stressors include her health issues which are effecting her 
daily life and feeling like she isn't getting the help she needs for her medical issues and 
difficulty getting to appointments due to no transportation.  



Intervention:  Administered medication as ordered with no adverse side effects noted. 
Provided a safe and therapeutic environment, clear communication, active listening and 
positive encouragement.  Talked with patient to contract for safety. Encouraged 
participation in group therapy. 



Response: Pt presented depressed and fatigued. Pt was compliant with care and medication. Pt 
isolated to her room most of the shift either sleeping or sitting in her chair looking out 
the window. Pt out for meals. Pt continues to endorse suicidal thoughts, but is able to 
contract for safety.  Pt declined to attend group Im just tired. 



Plan:  Patient requires in interruption of current crisis. Pt continues to endorse suicidal 
thoughts and presents with depressive symptoms. Pt requires medications stabilization and 
may be high risk discharge at this time.

## 2022-06-25 VITALS — DIASTOLIC BLOOD PRESSURE: 74 MMHG | SYSTOLIC BLOOD PRESSURE: 113 MMHG

## 2022-06-25 VITALS — DIASTOLIC BLOOD PRESSURE: 68 MMHG | SYSTOLIC BLOOD PRESSURE: 108 MMHG

## 2022-06-25 RX ADMIN — THERA TABS SCH EACH: TAB at 07:59

## 2022-06-25 RX ADMIN — METHADONE HYDROCHLORIDE SCH MG: 10 TABLET ORAL at 17:14

## 2022-06-25 RX ADMIN — PANTOPRAZOLE SODIUM SCH MG: 40 TABLET, DELAYED RELEASE ORAL at 07:58

## 2022-06-25 NOTE — NUR
Nursing Progress Note: 



Problem: 

Patient has a plan to OD on her medications and had gathered them in preparation.  She is a 
patient of Dr. Vaughan and attempted to make an appointment but was unable to be seen until 
October.  Current stressors include her health issues which are effecting her daily life and 
feeling like she isn't getting the help she needs for her medical issues and difficulty 
getting to appointments due to no transportation.  



Intervention:  Administered medication as ordered with no adverse side effects noted. 
Provided a safe and therapeutic environment, clear communication, active listening and 
positive encouragement.  Talked with patient to contract for safety. Encouraged 
participation in group therapy. 



Response: Patient presented more optimistic today. Pt attended all meals in group room and 
was more visible on unit. Pt did voice apprehension r/t not being discharged on Ativan. Pt 
states Ativan is what keeps me from craving alcohol. 



Plan:  Patient requires in interruption of current crisis. Pt endorses suicidal thoughts, 
but states thoughts are becoming less. Pt is more optimistic. Pt requires medication 
adjustment to therapeutic level for discharge.

## 2022-06-25 NOTE — NUR
PRN GIVEN:

pt was sitting up awake in bed, stating her mind was racing. She requests clonidine which 
was given to her with good affect.

## 2022-06-26 VITALS — SYSTOLIC BLOOD PRESSURE: 114 MMHG | DIASTOLIC BLOOD PRESSURE: 78 MMHG

## 2022-06-26 VITALS — SYSTOLIC BLOOD PRESSURE: 126 MMHG | DIASTOLIC BLOOD PRESSURE: 81 MMHG

## 2022-06-26 RX ADMIN — PANTOPRAZOLE SODIUM SCH MG: 40 TABLET, DELAYED RELEASE ORAL at 08:33

## 2022-06-26 RX ADMIN — THERA TABS SCH EACH: TAB at 08:33

## 2022-06-26 RX ADMIN — METHADONE HYDROCHLORIDE SCH MG: 10 TABLET ORAL at 17:23

## 2022-06-26 NOTE — NUR
Nursing Progress Note: 



Problem:  Patient has a plan to OD on her medications and had gathered them in preparation.  
She is a patient of Dr. Vaughan and attempted to make an appointment but was unable to be 
seen until October.  Current stressors include her health issues which are effecting her 
daily life and feeling like she isn't getting the help she needs for her medical issues and 
difficulty getting to appointments due to no transportation.  



Intervention:  Administered medication as ordered with no adverse side effects noted. Pts 
Gabapentin was increased to 300mg TID. Provided a safe and therapeutic environment, clear 
communication, active listening and positive encouragement.  Talked with patient to contract 
for safety. Encouraged participation in group therapy. 



Response: Patient continues to be more optimistic. Pt states this unit is a god send. Pt 
denies suicidal/homicidal thoughts at this time. Pt continues to have apprehension about 
being kicked out of here. She feels if she left here now she would drink and when she 
drinks she has suicidal thoughts. Pt worries she wont have her Ativan when she leaves as 
Ativan is what keeps me from craving alcohol. Pt states she has spoken to provider about 
this.  Pt attended all meals in group room and continues to be more visible on unit. Noted 
in group room coloring.  Pt denies A/VH. 



Plan:  Patient requires in interruption of current crisis. Pts suicidal thoughts are 
becoming less. Pt is more optimistic. Pt requires medication adjustment to therapeutic level 
for discharge.

## 2022-06-26 NOTE — NUR
pt pleasant. would not discuss anything. approached staff approximately 0200 for something 
for anxiety. PRN given. pt now asleep.

## 2022-06-27 VITALS — DIASTOLIC BLOOD PRESSURE: 76 MMHG | SYSTOLIC BLOOD PRESSURE: 114 MMHG

## 2022-06-27 VITALS — DIASTOLIC BLOOD PRESSURE: 77 MMHG | SYSTOLIC BLOOD PRESSURE: 112 MMHG

## 2022-06-27 VITALS — DIASTOLIC BLOOD PRESSURE: 78 MMHG | SYSTOLIC BLOOD PRESSURE: 113 MMHG

## 2022-06-27 RX ADMIN — PANTOPRAZOLE SODIUM SCH MG: 40 TABLET, DELAYED RELEASE ORAL at 08:55

## 2022-06-27 RX ADMIN — THERA TABS SCH EACH: TAB at 08:55

## 2022-06-27 RX ADMIN — METHADONE HYDROCHLORIDE SCH MG: 5 TABLET ORAL at 18:24

## 2022-06-27 RX ADMIN — METHADONE HYDROCHLORIDE SCH MG: 10 TABLET ORAL at 18:25

## 2022-06-27 NOTE — NUR
Nursing Progress Note: 



Problem:  Patient has a plan to OD on her medications and had gathered them in preparation.  
She is a patient of Dr. Vaughan and attempted to make an appointment but was unable to be 
seen until October.  Current stressors include her health issues which are effecting her 
daily life and feeling like she isn't getting the help she needs for her medical issues and 
difficulty getting to appointments due to no transportation.  



Intervention:  Administered medication as ordered with no adverse side effects noted. 
Provided a safe and therapeutic environment, clear communication, active listening and 
positive encouragement. 



Response: Patient was relaxing in the group room after dinner. She was sitting with her 
roommate watching a movie. Patient reports that she's doing well now that she is off all 
drugs and alcohol. "I'm not thinking of suicide right now. I'm actually feeling a little 
hopeful about recovery for the first time." She requested Clonidine early in the shift for 
anxiety. Patient was compliant with HS meds, but also requested Ativan to control the 
anxiety, and also help her get to sleep. Patient slept until ~0200 when she woke up from a 
dream believing that someone was on the phone for her. she easily went back to sleep.



Plan:  Patient requires in interruption of current crisis. Pt continues to endorse suicidal 
thoughts and presents with depressive symptoms. Pt requires medications stabilization and 
may be high risk discharge at this time.

## 2022-06-27 NOTE — NUR
PRNs Administered: Ativan 1mg



Interventions Offered: Pt. approached this writer at the beginning of the shift reporting 
anxiety. A quiet environment free from distractions was provided. 



Response to Medication: Pt. thanked this writer, and will continue to monitor closely.

## 2022-06-27 NOTE — NUR
Problem : Patient has a plan to OD on her medications and had gathered them in preparation.  
She is a patient of Dr. Vaughan and attempted to make an appointment but was unable to be 
seen until October.  Current stressors include her health issues which are effecting her 
daily life and feeling like she isn't getting the help she needs for her medical issues and 
difficulty getting to appointments due to no transportation.  



Pt. reports ongoing depression and anxiety.



Interventions : Introduced self and established rapport, maintained a safe and supportive 
environment, ensured contract for safety, provided clear and simple instructions, provided 
active listening and positive encouragement, and maintained Q 15min safety checks. 



Response : Received pt. awake on the unit at the beginning of the shift, she approached the 
nurses station and requested a PRN anxiolytic, Ativan was administered with effectiveness. 
Afterwards, pt. sat up in the Recreation Room watching TV with others. Pt. attended 
breakfast in the Group Room and afterwards retreated back to her room where she remained 
withdrawn throughout much of the shift. 1:1 was completed at bedside, pt. denies any current 
S/I, however reports ongoing depression and anxiety. She states, "I'm afraid if I go home 
without Ativan, I'll start drinking again." When questioned by this writer regarding her 
desire to attend substance rehabilitation, pt. reports she is afraid if she attends rehab 
she will see others that she used to use heroin or methamphetamines with and will be tempted 
to use again. Pt. states she is two years sober, and would rather go to [facility]. 

Pt. remains withdrawn in her room throughout much of the shift and is not observed to be 
interacting with others. 



Plan : Per MATY Tillman , pt. continues to require a safe and supportive environment.

## 2022-06-27 NOTE — NUR
PRNs Administered: Clonidine 0.1mg



Interventions Offered: This writer provided active listening and positive encouragement. 



Response to Medication: Pt. thanked this writer and lay down in bed, will continue to 
monitor closely.

## 2022-06-28 VITALS — SYSTOLIC BLOOD PRESSURE: 113 MMHG | DIASTOLIC BLOOD PRESSURE: 78 MMHG

## 2022-06-28 VITALS — DIASTOLIC BLOOD PRESSURE: 73 MMHG | SYSTOLIC BLOOD PRESSURE: 112 MMHG

## 2022-06-28 VITALS — DIASTOLIC BLOOD PRESSURE: 69 MMHG | SYSTOLIC BLOOD PRESSURE: 120 MMHG

## 2022-06-28 VITALS — DIASTOLIC BLOOD PRESSURE: 70 MMHG | SYSTOLIC BLOOD PRESSURE: 107 MMHG

## 2022-06-28 RX ADMIN — METHADONE HYDROCHLORIDE SCH MG: 10 TABLET ORAL at 17:53

## 2022-06-28 RX ADMIN — PANTOPRAZOLE SODIUM SCH MG: 40 TABLET, DELAYED RELEASE ORAL at 07:43

## 2022-06-28 RX ADMIN — METHADONE HYDROCHLORIDE SCH MG: 5 TABLET ORAL at 17:53

## 2022-06-28 RX ADMIN — THERA TABS SCH EACH: TAB at 08:28

## 2022-06-28 NOTE — NUR
Nursing Progress Note:



Problem : Patient has a plan to OD on her medications and had gathered them in preparation.  
She is a patient of Dr. Vaughan and attempted to make an appointment but was unable to be 
seen until October.  Current stressors include her health issues which are effecting her 
daily life and feeling like she isn't getting the help she needs for her medical issues and 
difficulty getting to appointments due to no transportation.  



Pt. reports some ongoing anxiety.



Interventions :Maintained a safe and supportive environment, ensured contract for safety, 
provided clear and simple instructions, provided active listening and positive 
encouragement, encouraged participation on the unit, and maintained Q 15min safety checks. 



Response : Received pt. sleeping at the beginning of the shift, she awoke a short time later 
and immediately came to the nurse's station requesting PRN Ativan for anxiety. Medication 
was administered with effectiveness. Pt. sat up in the Recreation Room interacting 
appropriately with others before attending breakfast. Afterwards, pt. retreated back to bed 
and 1:1 was completed at bedside. Pt. denies S/I or depression this shift and states 
animatedly, "I'm feeling a lot better." When questioned regarding anxiety, pt. admitted to 
some ongoing anxiety however stated, "With the Ativan, Neurontin, and Clonidine I think I 
can keep it under control." Pt. denies the desire to use any substances, including alcohol 
upon her return home. 

Pt. naps intermittently during the day, however is observed to have increased presence on 
the unit this shit and is interacting with others.



Plan : Per , pt. will likely discharge back home tomorrow, she continues to 
require a safe and supportive environment.

## 2022-06-28 NOTE — NUR
DCP



Presenting Issues: 

Pt's on Vol, denying SI/AH/VH/HI/Depression and reports that anxiety is fairly low.  



Interventions: 

Clinician met w/pt & engaged her in dcp activities to prepare for d/c tomorrow. Clinician 
had t/c with [facility] and coordinated a post-hospital follow-up for pt w/Dr. Hatch on 8/10 @ 
1:45PM. 



Clinician had t/c w/pt's PMD, PMD's nurse will reach out to pt to provide post-hospital f/u 
upon d/c. 



Plan: 

Pt to d/c tomorrow.

-------------------------------------------------------------------------------

Addendum: 06/28/22 at 1515 by Nalini VALADEZ

-------------------------------------------------------------------------------

Amended: Links added.

## 2022-06-28 NOTE — NUR
Nursing Progress Note: 



Problem:  Patient has a plan to OD on her medications and had gathered them in preparation.  
She is a patient of Dr. Vaughan and attempted to make an appointment but was unable to be 
seen until October.  Current stressors include her health issues which are effecting her 
daily life and feeling like she isn't getting the help she needs for her medical issues and 
difficulty getting to appointments due to no transportation.  



Intervention:  Administered medication as ordered with no adverse side effects noted. 
Provided a safe and therapeutic environment, clear communication, active listening and 
positive encouragement. 



Response:  Patient watched TV in the rec room with a couple cohorts. She is quiet and 
withdrawn, but answers when spoken to. Patient had received her Methadone not long ago, and 
looked like she was having hard time keeping her eyes open. She didn't feel like talking. 
Patient did report that she's still feeling depressed, but not suicidal. She denies other MH 
symptoms. She received her HS meds, and wanted Ativan closer to bedtime, so she let me know 
when she was ready. Patient woke up ~0140 and requested Clonidine with good results.



Plan:  Patient requires in interruption of current crisis. Pt continues to endorse suicidal 
thoughts and presents with depressive symptoms. Pt requires medications stabilization and 
may be high risk discharge at this time.

## 2022-06-28 NOTE — NUR
PRNs Administered: Clonidine 0.1mg



Interventions Offered: This writer provided active listening and positive encouragement. 



Response to Medication: Pt. thanked this write, will continue to monitor closely.

## 2022-06-29 VITALS — SYSTOLIC BLOOD PRESSURE: 112 MMHG | DIASTOLIC BLOOD PRESSURE: 65 MMHG

## 2022-06-29 RX ADMIN — THERA TABS SCH EACH: TAB at 07:21

## 2022-06-29 RX ADMIN — PANTOPRAZOLE SODIUM SCH MG: 40 TABLET, DELAYED RELEASE ORAL at 07:21

## 2022-06-29 NOTE — NUR
Discharge



Presenting Issues: 

Pt's on Vol status and is scheduled to d/c this morning. 



Interventions: 

Clinician had t/c w/[facility] to finalize pt's dcp. Per t/c pt's PMD will provide post-hospital 
f/u & refill meds for pt as pt's psychiatric care post-hospital appointment is scheduled too 
far out. 



Clinician met w/pt and finalized dcp, pt reported that she had been in contact w/her 
counselor @ [email or web address] and will be scheduled to see her MAT provider this week.  Pt received 
script for lower doses of Methadone to tie her over until she sees her MAT provider at the 
end of the week. 

Pt will contact her roommate to get ride home. 



Plan: 

Pt is discharging. 

Nalini Humphrey LCSW

-------------------------------------------------------------------------------

Addendum: 06/29/22 at 1048 by Nalini Humphrey SS

-------------------------------------------------------------------------------

Amended: Links added.

## 2022-06-29 NOTE — NUR
Nursing Progress Note:



Problem : Patient has a plan to OD on her medications and had gathered them in preparation.  
She is a patient of Dr. Vaughan and attempted to make an appointment but was unable to be 
seen until October.  Current stressors include her health issues which are effecting her 
daily life and feeling like she isn't getting the help she needs for her medical issues and 
difficulty getting to appointments due to no transportation.  



Pt. reports some ongoing anxiety.



Interventions :Maintained a safe and supportive environment, ensured contract for safety, 
provided clear and simple instructions, provided active listening and positive 
encouragement, encouraged participation on the unit, and maintained Q 15min safety checks, 
administered medications as ordered, gave PRN clonidine for anxiety. 



Response : Received pt. coloring at the beginning of the shift with peers.  Medication was 
administered with effectiveness. Pt. expressed that she had some anxiety about her possible 
discharge tomorrow but was "feeling good". Pt. denied any suicidal, homicidal ideations or 
hallucinations. Pt was cooperative during assessment and medication administration. 



Plan : Per , pt. will likely discharge back home tomorrow, she continues to 
require a safe and supportive environment.

## 2022-07-02 ENCOUNTER — HOSPITAL ENCOUNTER (EMERGENCY)
Dept: HOSPITAL 94 - ER | Age: 47
Discharge: HOME | End: 2022-07-02
Payer: MEDICAID

## 2022-07-02 VITALS — BODY MASS INDEX: 38.72 KG/M2 | WEIGHT: 210.43 LBS | HEIGHT: 62 IN

## 2022-07-02 VITALS — DIASTOLIC BLOOD PRESSURE: 93 MMHG | SYSTOLIC BLOOD PRESSURE: 119 MMHG

## 2022-07-02 DIAGNOSIS — F31.9: ICD-10-CM

## 2022-07-02 DIAGNOSIS — F17.200: ICD-10-CM

## 2022-07-02 DIAGNOSIS — Z88.6: ICD-10-CM

## 2022-07-02 DIAGNOSIS — D64.9: ICD-10-CM

## 2022-07-02 DIAGNOSIS — Z79.899: ICD-10-CM

## 2022-07-02 DIAGNOSIS — T83.89XA: Primary | ICD-10-CM

## 2022-07-02 DIAGNOSIS — Z88.8: ICD-10-CM

## 2022-07-02 LAB
AMORPH URATE CRY #/AREA URNS HPF: (no result) /[HPF]
BACTERIA URNS QL MICRO: (no result) /HPF
CLARITY UR: CLEAR
COLOR UR: YELLOW
DEPRECATED SQUAMOUS URNS QL MICRO: (no result) /LPF
GLUCOSE UR STRIP-MCNC: NEGATIVE MG/DL
HGB UR QL STRIP: (no result)
KETONES UR STRIP-MCNC: NEGATIVE MG/DL
LEUKOCYTE ESTERASE UR QL STRIP: (no result)
MUCOUS THREADS URNS QL MICRO: (no result) /LPF
NITRITE UR QL STRIP: NEGATIVE
PH UR STRIP: 6 [PH] (ref 4.8–8)
PROT UR QL STRIP: NEGATIVE MG/DL
RBC #/AREA URNS HPF: (no result) /HPF (ref 0–2)
SP GR UR STRIP: 1.02 (ref 1–1.03)
T VAGINALIS URNS QL MICRO: (no result) /HPF
URN COLLECT METHOD CLASS: (no result)
UROBILINOGEN UR STRIP-MCNC: 4 E.U/DL (ref 0.2–1)
WBC #/AREA URNS HPF: (no result) /HPF (ref 0–4)

## 2022-07-02 PROCEDURE — 81001 URINALYSIS AUTO W/SCOPE: CPT

## 2022-07-02 PROCEDURE — 96372 THER/PROPH/DIAG INJ SC/IM: CPT

## 2022-07-02 PROCEDURE — 36415 COLL VENOUS BLD VENIPUNCTURE: CPT

## 2022-07-02 PROCEDURE — 87491 CHLMYD TRACH DNA AMP PROBE: CPT

## 2022-07-02 PROCEDURE — 86592 SYPHILIS TEST NON-TREP QUAL: CPT

## 2022-07-02 PROCEDURE — 87591 N.GONORRHOEAE DNA AMP PROB: CPT

## 2022-07-02 PROCEDURE — 99284 EMERGENCY DEPT VISIT MOD MDM: CPT

## 2022-09-08 ENCOUNTER — HOSPITAL ENCOUNTER (EMERGENCY)
Dept: HOSPITAL 94 - ER | Age: 47
Discharge: HOME | End: 2022-09-08
Payer: MEDICAID

## 2022-09-08 VITALS — BODY MASS INDEX: 36.88 KG/M2 | WEIGHT: 200.42 LBS | HEIGHT: 62 IN

## 2022-09-08 VITALS — SYSTOLIC BLOOD PRESSURE: 141 MMHG | DIASTOLIC BLOOD PRESSURE: 81 MMHG

## 2022-09-08 DIAGNOSIS — F32.A: ICD-10-CM

## 2022-09-08 DIAGNOSIS — K29.20: Primary | ICD-10-CM

## 2022-09-08 DIAGNOSIS — Z88.6: ICD-10-CM

## 2022-09-08 DIAGNOSIS — Z88.8: ICD-10-CM

## 2022-09-08 DIAGNOSIS — Z86.19: ICD-10-CM

## 2022-09-08 DIAGNOSIS — Z72.89: ICD-10-CM

## 2022-09-08 DIAGNOSIS — Z98.890: ICD-10-CM

## 2022-09-08 DIAGNOSIS — R19.7: ICD-10-CM

## 2022-09-08 DIAGNOSIS — R11.10: ICD-10-CM

## 2022-09-08 DIAGNOSIS — Z79.899: ICD-10-CM

## 2022-09-08 DIAGNOSIS — Z86.2: ICD-10-CM

## 2022-09-08 DIAGNOSIS — R10.11: ICD-10-CM

## 2022-09-08 DIAGNOSIS — Z90.49: ICD-10-CM

## 2022-09-08 DIAGNOSIS — F41.9: ICD-10-CM

## 2022-09-08 LAB
ALBUMIN SERPL BCP-MCNC: 2.9 G/DL (ref 3.4–5)
ALBUMIN/GLOB SERPL: 0.6 {RATIO} (ref 1.1–1.5)
ALP SERPL-CCNC: 478 IU/L (ref 46–116)
ALT SERPL W P-5'-P-CCNC: 72 U/L (ref 12–78)
ANION GAP SERPL CALCULATED.3IONS-SCNC: 8 MMOL/L (ref 8–16)
AST SERPL W P-5'-P-CCNC: 140 U/L (ref 10–37)
BASOPHILS # BLD AUTO: 0 X10'3 (ref 0–0.2)
BASOPHILS NFR BLD AUTO: 0.6 % (ref 0–1)
BILIRUB SERPL-MCNC: 0.7 MG/DL (ref 0.1–1)
BUN SERPL-MCNC: 2 MG/DL (ref 7–18)
BUN/CREAT SERPL: 2.8 (ref 6.6–38)
CALCIUM SERPL-MCNC: 9 MG/DL (ref 8.5–10.1)
CHLORIDE SERPL-SCNC: 107 MMOL/L (ref 99–107)
CO2 SERPL-SCNC: 28.1 MMOL/L (ref 24–32)
CREAT SERPL-MCNC: 0.71 MG/DL (ref 0.4–0.9)
EOSINOPHIL # BLD AUTO: 0.1 X10'3 (ref 0–0.9)
EOSINOPHIL NFR BLD AUTO: 1.5 % (ref 0–6)
ERYTHROCYTE [DISTWIDTH] IN BLOOD BY AUTOMATED COUNT: 20.2 % (ref 11.5–14.5)
GFR SERPL CREATININE-BSD FRML MDRD: 89 ML/MIN
GLUCOSE SERPL-MCNC: 82 MG/DL (ref 70–104)
HCT VFR BLD AUTO: 43.2 % (ref 35–45)
HGB BLD-MCNC: 14.2 G/DL (ref 12–16)
LYMPHOCYTES # BLD AUTO: 0.8 X10'3 (ref 1.1–4.8)
LYMPHOCYTES NFR BLD AUTO: 21.5 % (ref 21–51)
MCH RBC QN AUTO: 30.9 PG (ref 27–31)
MCHC RBC AUTO-ENTMCNC: 32.8 G/DL (ref 33–36.5)
MCV RBC AUTO: 94.4 FL (ref 78–98)
MONOCYTES # BLD AUTO: 0.3 X10'3 (ref 0–0.9)
MONOCYTES NFR BLD AUTO: 8.1 % (ref 2–12)
NEUTROPHILS # BLD AUTO: 2.6 X10'3 (ref 1.8–7.7)
NEUTROPHILS NFR BLD AUTO: 68.3 % (ref 42–75)
PLATELET # BLD AUTO: 136 X10'3 (ref 140–440)
PMV BLD AUTO: 7.5 FL (ref 7.4–10.4)
POTASSIUM SERPL-SCNC: 3.6 MMOL/L (ref 3.5–5.1)
PROT SERPL-MCNC: 7.5 G/DL (ref 6.4–8.2)
RBC # BLD AUTO: 4.58 X10'6 (ref 4.2–5.6)
SODIUM SERPL-SCNC: 143 MMOL/L (ref 135–145)
WBC # BLD AUTO: 3.8 X10'3 (ref 4.5–11)

## 2022-09-08 PROCEDURE — 96374 THER/PROPH/DIAG INJ IV PUSH: CPT

## 2022-09-08 PROCEDURE — 99284 EMERGENCY DEPT VISIT MOD MDM: CPT

## 2022-09-08 PROCEDURE — 80053 COMPREHEN METABOLIC PANEL: CPT

## 2022-09-08 PROCEDURE — 85025 COMPLETE CBC W/AUTO DIFF WBC: CPT

## 2022-10-15 ENCOUNTER — HOSPITAL ENCOUNTER (EMERGENCY)
Dept: HOSPITAL 94 - ER | Age: 47
Discharge: HOME | End: 2022-10-15
Payer: MEDICAID

## 2022-10-15 VITALS — DIASTOLIC BLOOD PRESSURE: 66 MMHG | SYSTOLIC BLOOD PRESSURE: 107 MMHG

## 2022-10-15 VITALS — WEIGHT: 189.6 LBS | HEIGHT: 62 IN | BODY MASS INDEX: 34.89 KG/M2

## 2022-10-15 DIAGNOSIS — Z98.890: ICD-10-CM

## 2022-10-15 DIAGNOSIS — Z88.8: ICD-10-CM

## 2022-10-15 DIAGNOSIS — Z88.6: ICD-10-CM

## 2022-10-15 DIAGNOSIS — Z79.899: ICD-10-CM

## 2022-10-15 DIAGNOSIS — Z90.49: ICD-10-CM

## 2022-10-15 DIAGNOSIS — R10.9: ICD-10-CM

## 2022-10-15 DIAGNOSIS — D64.9: ICD-10-CM

## 2022-10-15 DIAGNOSIS — R50.9: ICD-10-CM

## 2022-10-15 DIAGNOSIS — F32.A: ICD-10-CM

## 2022-10-15 DIAGNOSIS — F41.9: ICD-10-CM

## 2022-10-15 DIAGNOSIS — T81.9XXA: Primary | ICD-10-CM

## 2022-10-15 LAB
ALBUMIN SERPL BCP-MCNC: 2.9 G/DL (ref 3.4–5)
ALBUMIN/GLOB SERPL: 0.8 {RATIO} (ref 1.1–1.5)
ALP SERPL-CCNC: 324 IU/L (ref 46–116)
ALT SERPL W P-5'-P-CCNC: 39 U/L (ref 12–78)
AMORPH URATE CRY #/AREA URNS HPF: (no result) /[HPF]
ANION GAP SERPL CALCULATED.3IONS-SCNC: 8 MMOL/L (ref 8–16)
ANISOCYTOSIS BLD QL SMEAR: (no result)
AST SERPL W P-5'-P-CCNC: 40 U/L (ref 10–37)
BACTERIA URNS QL MICRO: (no result) /HPF
BASOPHILS # BLD AUTO: 0 X10'3 (ref 0–0.2)
BASOPHILS NFR BLD AUTO: 0.7 % (ref 0–1)
BILIRUB SERPL-MCNC: 0.3 MG/DL (ref 0.1–1)
BUN SERPL-MCNC: 6 MG/DL (ref 7–18)
BUN/CREAT SERPL: 8.2 (ref 6.6–38)
CALCIUM SERPL-MCNC: 8.6 MG/DL (ref 8.5–10.1)
CHLORIDE SERPL-SCNC: 107 MMOL/L (ref 99–107)
CLARITY UR: (no result)
CO2 SERPL-SCNC: 27.9 MMOL/L (ref 24–32)
COLOR UR: (no result)
CREAT SERPL-MCNC: 0.73 MG/DL (ref 0.4–0.9)
DEPRECATED SQUAMOUS URNS QL MICRO: (no result) /LPF
EOSINOPHIL # BLD AUTO: 0.1 X10'3 (ref 0–0.9)
EOSINOPHIL NFR BLD AUTO: 5.5 % (ref 0–6)
EOSINOPHIL NFR BLD MANUAL: 2 % (ref 0–6)
ERYTHROCYTE [DISTWIDTH] IN BLOOD BY AUTOMATED COUNT: 20.4 % (ref 11.5–14.5)
GFR SERPL CREATININE-BSD FRML MDRD: 86 ML/MIN
GLUCOSE SERPL-MCNC: 81 MG/DL (ref 70–104)
GLUCOSE UR STRIP-MCNC: NEGATIVE MG/DL
HCT VFR BLD AUTO: 36.8 % (ref 35–45)
HGB BLD-MCNC: 12.1 G/DL (ref 12–16)
HGB UR QL STRIP: (no result)
KETONES UR STRIP-MCNC: NEGATIVE MG/DL
LEUKOCYTE ESTERASE UR QL STRIP: (no result)
LYMPHOCYTES # BLD AUTO: 1 X10'3 (ref 1.1–4.8)
LYMPHOCYTES NFR BLD AUTO: 36.4 % (ref 21–51)
LYMPHOCYTES NFR BLD MANUAL: 33 % (ref 21–51)
MACROCYTES BLD QL SMEAR: (no result)
MCH RBC QN AUTO: 31.4 PG (ref 27–31)
MCHC RBC AUTO-ENTMCNC: 33 G/DL (ref 33–36.5)
MCV RBC AUTO: 95.1 FL (ref 78–98)
MONOCYTES # BLD AUTO: 0.4 X10'3 (ref 0–0.9)
MONOCYTES NFR BLD AUTO: 14.7 % (ref 2–12)
MONOCYTES NFR BLD MANUAL: 12 % (ref 2–12)
MUCOUS THREADS URNS QL MICRO: (no result) /LPF
NEUTROPHILS # BLD AUTO: 1.1 X10'3 (ref 1.8–7.7)
NEUTROPHILS NFR BLD AUTO: 42.7 % (ref 42–75)
NEUTS SEG NFR BLD MANUAL: 53 % (ref 42–75)
NITRITE UR QL STRIP: NEGATIVE
PH UR STRIP: 5.5 [PH] (ref 4.8–8)
PLATELET # BLD AUTO: 118 X10'3 (ref 140–440)
PLATELET BLD QL SMEAR: (no result)
PMV BLD AUTO: 7.3 FL (ref 7.4–10.4)
POTASSIUM SERPL-SCNC: 3.5 MMOL/L (ref 3.5–5.1)
PROT SERPL-MCNC: 6.5 G/DL (ref 6.4–8.2)
PROT UR QL STRIP: NEGATIVE MG/DL
RBC # BLD AUTO: 3.87 X10'6 (ref 4.2–5.6)
RBC #/AREA URNS HPF: (no result) /HPF (ref 0–2)
RBC MORPH BLD: (no result)
SODIUM SERPL-SCNC: 143 MMOL/L (ref 135–145)
SP GR UR STRIP: <=1.005 (ref 1–1.03)
TOTAL CELLS COUNTED FLD: 100
URN COLLECT METHOD CLASS: (no result)
UROBILINOGEN UR STRIP-MCNC: 1 E.U/DL (ref 0.2–1)
WBC # BLD AUTO: 2.7 X10'3 (ref 4.5–11)
WBC #/AREA URNS HPF: (no result) /HPF (ref 0–4)

## 2022-10-15 PROCEDURE — 76856 US EXAM PELVIC COMPLETE: CPT

## 2022-10-15 PROCEDURE — 85007 BL SMEAR W/DIFF WBC COUNT: CPT

## 2022-10-15 PROCEDURE — 81001 URINALYSIS AUTO W/SCOPE: CPT

## 2022-10-15 PROCEDURE — 85025 COMPLETE CBC W/AUTO DIFF WBC: CPT

## 2022-10-15 PROCEDURE — 76830 TRANSVAGINAL US NON-OB: CPT

## 2022-10-15 PROCEDURE — 80053 COMPREHEN METABOLIC PANEL: CPT

## 2022-10-15 PROCEDURE — 99284 EMERGENCY DEPT VISIT MOD MDM: CPT

## 2023-02-08 ENCOUNTER — HOSPITAL ENCOUNTER (EMERGENCY)
Dept: HOSPITAL 94 - ER | Age: 48
Discharge: HOME | End: 2023-02-08
Payer: MEDICAID

## 2023-02-08 VITALS — HEIGHT: 62 IN | WEIGHT: 208.45 LBS | BODY MASS INDEX: 38.36 KG/M2

## 2023-02-08 VITALS — DIASTOLIC BLOOD PRESSURE: 59 MMHG | SYSTOLIC BLOOD PRESSURE: 118 MMHG

## 2023-02-08 DIAGNOSIS — Z88.8: ICD-10-CM

## 2023-02-08 DIAGNOSIS — R07.89: ICD-10-CM

## 2023-02-08 DIAGNOSIS — Z90.49: ICD-10-CM

## 2023-02-08 DIAGNOSIS — Z79.899: ICD-10-CM

## 2023-02-08 DIAGNOSIS — F31.9: ICD-10-CM
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
## 2023-02-08 DIAGNOSIS — R07.81: Primary | ICD-10-CM

## 2023-02-08 DIAGNOSIS — Z88.6: ICD-10-CM

## 2023-02-08 DIAGNOSIS — M25.551: ICD-10-CM
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
## 2023-02-08 DIAGNOSIS — Z98.890: ICD-10-CM

## 2023-02-08 PROCEDURE — 71101 X-RAY EXAM UNILAT RIBS/CHEST: CPT

## 2023-02-08 PROCEDURE — 73502 X-RAY EXAM HIP UNI 2-3 VIEWS: CPT

## 2023-02-08 PROCEDURE — 99284 EMERGENCY DEPT VISIT MOD MDM: CPT

## 2023-02-08 PROCEDURE — 96372 THER/PROPH/DIAG INJ SC/IM: CPT

## 2023-02-08 RX ADMIN — MORPHINE SULFATE ONE MG: 4 INJECTION, SOLUTION INTRAMUSCULAR; INTRAVENOUS at 21:14

## 2023-02-08 RX ADMIN — MORPHINE SULFATE ONE MG: 4 INJECTION, SOLUTION INTRAMUSCULAR; INTRAVENOUS at 21:06

## 2024-08-05 ENCOUNTER — HOSPITAL ENCOUNTER (INPATIENT)
Dept: HOSPITAL 94 - ER | Age: 49
LOS: 14 days | Discharge: HOME | DRG: 751 | End: 2024-08-19
Attending: PSYCHIATRY & NEUROLOGY | Admitting: PSYCHIATRY & NEUROLOGY
Payer: MEDICAID

## 2024-08-05 VITALS — HEIGHT: 62 IN | BODY MASS INDEX: 37.89 KG/M2 | WEIGHT: 205.91 LBS

## 2024-08-05 DIAGNOSIS — D69.6: ICD-10-CM

## 2024-08-05 DIAGNOSIS — Z20.822: ICD-10-CM

## 2024-08-05 DIAGNOSIS — E88.09: ICD-10-CM

## 2024-08-05 DIAGNOSIS — Z85.118: ICD-10-CM

## 2024-08-05 DIAGNOSIS — Z90.710: ICD-10-CM

## 2024-08-05 DIAGNOSIS — Z82.3: ICD-10-CM

## 2024-08-05 DIAGNOSIS — R45.851: ICD-10-CM

## 2024-08-05 DIAGNOSIS — E86.1: ICD-10-CM

## 2024-08-05 DIAGNOSIS — Z83.3: ICD-10-CM

## 2024-08-05 DIAGNOSIS — F17.210: ICD-10-CM

## 2024-08-05 DIAGNOSIS — Z86.73: ICD-10-CM

## 2024-08-05 DIAGNOSIS — F33.1: Primary | ICD-10-CM

## 2024-08-05 DIAGNOSIS — Z88.8: ICD-10-CM

## 2024-08-05 DIAGNOSIS — E66.9: ICD-10-CM

## 2024-08-05 DIAGNOSIS — Z80.1: ICD-10-CM

## 2024-08-05 DIAGNOSIS — F41.9: ICD-10-CM

## 2024-08-05 LAB
ALBUMIN SERPL BCP-MCNC: 2.5 G/DL (ref 3.4–5)
AMPHETAMINES UR QL SCN: NEGATIVE
ANION GAP SERPL CALCULATED.3IONS-SCNC: 8 MMOL/L (ref 8–16)
ANISOCYTOSIS BLD QL SMEAR: (no result)
BACTERIA URNS QL MICRO: (no result) /HPF
BARBITURATES UR QL SCN: NEGATIVE
BASOPHILS # BLD AUTO: 0 X10'3 (ref 0–0.2)
BASOPHILS NFR BLD AUTO: 0.9 % (ref 0–1)
BENZODIAZ UR QL SCN: NEGATIVE
BILIRUB UR QL STRIP: (no result)
BUN SERPL-MCNC: 6 MG/DL (ref 7–18)
BUN/CREAT SERPL: 8.7 (ref 10–20)
BZE UR QL SCN: NEGATIVE
CALCIUM SERPL-MCNC: 8.2 MG/DL (ref 8.5–10.1)
CANNABINOIDS UR QL SCN: NEGATIVE
CHLORIDE SERPL-SCNC: 97 MMOL/L (ref 99–107)
CLARITY UR: CLEAR
CO2 SERPL-SCNC: 27.5 MMOL/L (ref 24–32)
COLOR UR: (no result)
CREAT CL PREDICTED SERPL C-G-VRATE: 79 ML/MIN
CREAT SERPL-MCNC: 0.69 MG/DL (ref 0.4–0.9)
DEPRECATED SQUAMOUS URNS QL MICRO: (no result) /LPF
EOSINOPHIL # BLD AUTO: 0 X10'3 (ref 0–0.9)
EOSINOPHIL NFR BLD AUTO: 1.2 % (ref 0–6)
ERYTHROCYTE [DISTWIDTH] IN BLOOD BY AUTOMATED COUNT: 22.6 % (ref 11.5–14.5)
ETHANOL SERPL-MCNC: 94 MG/DL (ref ?–10)
GFR SERPL CREATININE-BSD FRML MDRD: > 90 ML/MIN
GLUCOSE SERPL-MCNC: 94 MG/DL (ref 70–104)
GLUCOSE UR STRIP-MCNC: (no result) MG/DL
HCG UR QL: NEGATIVE
HCT VFR BLD AUTO: 37.1 % (ref 35–45)
HGB BLD-MCNC: 12.4 G/DL (ref 12–16)
HGB UR QL STRIP: (no result)
KETONES UR STRIP-MCNC: (no result) MG/DL
LEUKOCYTE ESTERASE UR QL STRIP: (no result)
LYMPHOCYTES # BLD AUTO: 1.1 X10'3 (ref 1.1–4.8)
LYMPHOCYTES NFR BLD AUTO: 31 % (ref 21–51)
MACROCYTES BLD QL SMEAR: (no result)
MCH RBC QN AUTO: 34.6 PG (ref 27–31)
MCHC RBC AUTO-ENTMCNC: 33.3 G/DL (ref 33–36.5)
MCV RBC AUTO: 103.8 FL (ref 78–98)
METHADONE UR QL SCN: POSITIVE
MONOCYTES # BLD AUTO: 0.3 X10'3 (ref 0–0.9)
MONOCYTES NFR BLD AUTO: 9.4 % (ref 2–12)
NEUTROPHILS # BLD AUTO: 2.1 X10'3 (ref 1.8–7.7)
NEUTROPHILS NFR BLD AUTO: 57.5 % (ref 42–75)
NITRITE UR QL STRIP: (no result)
OPIATES UR QL SCN: NEGATIVE
PCP UR QL SCN: NEGATIVE
PH UR STRIP: (no result) [PH] (ref 4.8–8)
PLATELET # BLD AUTO: 129 X10'3 (ref 140–440)
PLATELET BLD QL SMEAR: (no result)
PMV BLD AUTO: 7.8 FL (ref 7.4–10.4)
POTASSIUM SERPL-SCNC: 3.8 MMOL/L (ref 3.5–5.1)
PROT UR QL STRIP: (no result) MG/DL
RBC # BLD AUTO: 3.57 X10'6 (ref 4.2–5.6)
RBC #/AREA URNS HPF: (no result) /HPF (ref 0–2)
RBC MORPH BLD: (no result)
SODIUM SERPL-SCNC: 132 MMOL/L (ref 135–145)
SP GR UR STRIP: (no result) (ref 1–1.03)
STARCH GRANULES URNS QL MICRO: (no result) /HPF
STOMATOCYTES BLD QL SMEAR: (no result)
TSH SERPL DL<=0.05 MIU/L-ACNC: 4.19 ULU/ML (ref 0.34–4.5)
URN COLLECT METHOD CLASS: (no result)
UROBILINOGEN UR STRIP-MCNC: (no result) E.U/DL (ref 0.2–1)
WBC # BLD AUTO: 3.7 X10'3 (ref 4.5–11)
WBC #/AREA URNS HPF: (no result) /HPF (ref 0–4)

## 2024-08-05 PROCEDURE — 81001 URINALYSIS AUTO W/SCOPE: CPT

## 2024-08-05 PROCEDURE — 85008 BL SMEAR W/O DIFF WBC COUNT: CPT

## 2024-08-05 PROCEDURE — 36415 COLL VENOUS BLD VENIPUNCTURE: CPT

## 2024-08-05 PROCEDURE — 80053 COMPREHEN METABOLIC PANEL: CPT

## 2024-08-05 PROCEDURE — 81025 URINE PREGNANCY TEST: CPT

## 2024-08-05 PROCEDURE — 71045 X-RAY EXAM CHEST 1 VIEW: CPT

## 2024-08-05 PROCEDURE — 80305 DRUG TEST PRSMV DIR OPT OBS: CPT

## 2024-08-05 PROCEDURE — 80048 BASIC METABOLIC PNL TOTAL CA: CPT

## 2024-08-05 PROCEDURE — 99285 EMERGENCY DEPT VISIT HI MDM: CPT

## 2024-08-05 PROCEDURE — 85025 COMPLETE CBC W/AUTO DIFF WBC: CPT

## 2024-08-05 PROCEDURE — 71250 CT THORAX DX C-: CPT

## 2024-08-05 PROCEDURE — 84443 ASSAY THYROID STIM HORMONE: CPT

## 2024-08-05 PROCEDURE — 87081 CULTURE SCREEN ONLY: CPT

## 2024-08-05 PROCEDURE — 87811 SARS-COV-2 COVID19 W/OPTIC: CPT

## 2024-08-05 PROCEDURE — 80320 DRUG SCREEN QUANTALCOHOLS: CPT

## 2024-08-06 VITALS — RESPIRATION RATE: 18 BRPM | OXYGEN SATURATION: 95 %

## 2024-08-06 VITALS
HEART RATE: 102 BPM | SYSTOLIC BLOOD PRESSURE: 100 MMHG | OXYGEN SATURATION: 95 % | TEMPERATURE: 97.8 F | RESPIRATION RATE: 18 BRPM | DIASTOLIC BLOOD PRESSURE: 69 MMHG

## 2024-08-06 RX ADMIN — METHADONE HYDROCHLORIDE ONE MG: 10 TABLET ORAL at 16:05

## 2024-08-07 VITALS
SYSTOLIC BLOOD PRESSURE: 112 MMHG | TEMPERATURE: 98 F | RESPIRATION RATE: 15 BRPM | OXYGEN SATURATION: 94 % | DIASTOLIC BLOOD PRESSURE: 70 MMHG | HEART RATE: 95 BPM

## 2024-08-07 VITALS
OXYGEN SATURATION: 95 % | HEART RATE: 112 BPM | SYSTOLIC BLOOD PRESSURE: 127 MMHG | TEMPERATURE: 97 F | RESPIRATION RATE: 16 BRPM | DIASTOLIC BLOOD PRESSURE: 88 MMHG

## 2024-08-07 VITALS — RESPIRATION RATE: 15 BRPM | OXYGEN SATURATION: 94 %

## 2024-08-07 VITALS — RESPIRATION RATE: 16 BRPM | OXYGEN SATURATION: 95 %

## 2024-08-07 PROCEDURE — GZHZZZZ GROUP PSYCHOTHERAPY: ICD-10-PCS | Performed by: PSYCHIATRY & NEUROLOGY

## 2024-08-07 PROCEDURE — GZ51ZZZ INDIVIDUAL PSYCHOTHERAPY, BEHAVIORAL: ICD-10-PCS | Performed by: PSYCHIATRY & NEUROLOGY

## 2024-08-07 RX ADMIN — METHADONE HYDROCHLORIDE SCH MG: 10 TABLET ORAL at 08:43

## 2024-08-08 VITALS
HEART RATE: 102 BPM | DIASTOLIC BLOOD PRESSURE: 70 MMHG | OXYGEN SATURATION: 97 % | RESPIRATION RATE: 16 BRPM | SYSTOLIC BLOOD PRESSURE: 109 MMHG | TEMPERATURE: 98 F

## 2024-08-08 VITALS
HEART RATE: 103 BPM | RESPIRATION RATE: 16 BRPM | DIASTOLIC BLOOD PRESSURE: 80 MMHG | OXYGEN SATURATION: 92 % | TEMPERATURE: 98.4 F | SYSTOLIC BLOOD PRESSURE: 125 MMHG

## 2024-08-08 VITALS — OXYGEN SATURATION: 96 % | RESPIRATION RATE: 18 BRPM

## 2024-08-08 LAB
BACTERIA URNS QL MICRO: (no result) /HPF
BILIRUB UR QL STRIP: (no result)
CLARITY UR: (no result)
COLOR UR: YELLOW
DEPRECATED SQUAMOUS URNS QL MICRO: (no result) /LPF
GLUCOSE UR STRIP-MCNC: NEGATIVE MG/DL
HGB UR QL STRIP: NEGATIVE
KETONES UR STRIP-MCNC: NEGATIVE MG/DL
LEUKOCYTE ESTERASE UR QL STRIP: NEGATIVE
MUCOUS THREADS URNS QL MICRO: (no result) /LPF
NITRITE UR QL STRIP: NEGATIVE
PH UR STRIP: 6 [PH] (ref 4.8–8)
PROT UR QL STRIP: NEGATIVE MG/DL
RBC #/AREA URNS HPF: (no result) /HPF (ref 0–2)
SP GR UR STRIP: 1.02 (ref 1–1.03)
URN COLLECT METHOD CLASS: (no result)
UROBILINOGEN UR STRIP-MCNC: >=8 E.U/DL (ref 0.2–1)
WBC #/AREA URNS HPF: (no result) /HPF (ref 0–4)

## 2024-08-09 VITALS
OXYGEN SATURATION: 97 % | RESPIRATION RATE: 16 BRPM | TEMPERATURE: 97.3 F | HEART RATE: 83 BPM | SYSTOLIC BLOOD PRESSURE: 105 MMHG | DIASTOLIC BLOOD PRESSURE: 70 MMHG

## 2024-08-09 VITALS
OXYGEN SATURATION: 92 % | RESPIRATION RATE: 18 BRPM | SYSTOLIC BLOOD PRESSURE: 92 MMHG | HEART RATE: 62 BPM | DIASTOLIC BLOOD PRESSURE: 57 MMHG | TEMPERATURE: 97.2 F

## 2024-08-09 VITALS — OXYGEN SATURATION: 92 % | RESPIRATION RATE: 18 BRPM

## 2024-08-09 VITALS — HEART RATE: 91 BPM | SYSTOLIC BLOOD PRESSURE: 103 MMHG | DIASTOLIC BLOOD PRESSURE: 74 MMHG

## 2024-08-09 VITALS — OXYGEN SATURATION: 97 % | RESPIRATION RATE: 16 BRPM

## 2024-08-09 RX ADMIN — MAGNESIUM HYDROXIDE PRN ML: 400 SUSPENSION ORAL at 11:27

## 2024-08-10 VITALS
TEMPERATURE: 97.7 F | SYSTOLIC BLOOD PRESSURE: 119 MMHG | OXYGEN SATURATION: 94 % | HEART RATE: 88 BPM | DIASTOLIC BLOOD PRESSURE: 82 MMHG | RESPIRATION RATE: 15 BRPM

## 2024-08-10 VITALS
TEMPERATURE: 97.1 F | HEART RATE: 85 BPM | SYSTOLIC BLOOD PRESSURE: 103 MMHG | OXYGEN SATURATION: 95 % | RESPIRATION RATE: 16 BRPM | DIASTOLIC BLOOD PRESSURE: 78 MMHG

## 2024-08-10 VITALS
HEART RATE: 89 BPM | OXYGEN SATURATION: 100 % | DIASTOLIC BLOOD PRESSURE: 79 MMHG | SYSTOLIC BLOOD PRESSURE: 121 MMHG | RESPIRATION RATE: 16 BRPM | TEMPERATURE: 97.3 F

## 2024-08-10 VITALS — RESPIRATION RATE: 16 BRPM | OXYGEN SATURATION: 100 %

## 2024-08-11 VITALS — RESPIRATION RATE: 16 BRPM

## 2024-08-11 VITALS — RESPIRATION RATE: 18 BRPM | OXYGEN SATURATION: 97 %

## 2024-08-11 VITALS
DIASTOLIC BLOOD PRESSURE: 78 MMHG | RESPIRATION RATE: 16 BRPM | OXYGEN SATURATION: 94 % | TEMPERATURE: 97.8 F | SYSTOLIC BLOOD PRESSURE: 112 MMHG | HEART RATE: 84 BPM

## 2024-08-11 VITALS
SYSTOLIC BLOOD PRESSURE: 105 MMHG | DIASTOLIC BLOOD PRESSURE: 81 MMHG | TEMPERATURE: 98.6 F | HEART RATE: 67 BPM | RESPIRATION RATE: 18 BRPM | OXYGEN SATURATION: 97 %

## 2024-08-12 VITALS — SYSTOLIC BLOOD PRESSURE: 99 MMHG | DIASTOLIC BLOOD PRESSURE: 55 MMHG | HEART RATE: 98 BPM

## 2024-08-12 VITALS
OXYGEN SATURATION: 95 % | HEART RATE: 84 BPM | TEMPERATURE: 97.3 F | SYSTOLIC BLOOD PRESSURE: 86 MMHG | DIASTOLIC BLOOD PRESSURE: 52 MMHG | RESPIRATION RATE: 16 BRPM

## 2024-08-12 VITALS
OXYGEN SATURATION: 97 % | SYSTOLIC BLOOD PRESSURE: 113 MMHG | HEART RATE: 94 BPM | DIASTOLIC BLOOD PRESSURE: 74 MMHG | TEMPERATURE: 98.1 F | RESPIRATION RATE: 18 BRPM

## 2024-08-12 VITALS — OXYGEN SATURATION: 97 % | RESPIRATION RATE: 18 BRPM

## 2024-08-12 RX ADMIN — BISACODYL PRN MG: 5 TABLET, COATED ORAL at 20:13

## 2024-08-13 VITALS
DIASTOLIC BLOOD PRESSURE: 74 MMHG | TEMPERATURE: 97.4 F | OXYGEN SATURATION: 93 % | HEART RATE: 83 BPM | SYSTOLIC BLOOD PRESSURE: 114 MMHG | RESPIRATION RATE: 16 BRPM

## 2024-08-13 VITALS — RESPIRATION RATE: 16 BRPM | OXYGEN SATURATION: 97 %

## 2024-08-13 VITALS
DIASTOLIC BLOOD PRESSURE: 72 MMHG | HEART RATE: 90 BPM | SYSTOLIC BLOOD PRESSURE: 101 MMHG | RESPIRATION RATE: 16 BRPM | TEMPERATURE: 97.3 F | OXYGEN SATURATION: 90 %

## 2024-08-13 VITALS — SYSTOLIC BLOOD PRESSURE: 95 MMHG | HEART RATE: 85 BPM | DIASTOLIC BLOOD PRESSURE: 68 MMHG

## 2024-08-13 RX ADMIN — BISACODYL PRN MG: 10 SUPPOSITORY RECTAL at 07:59

## 2024-08-13 RX ADMIN — ACETAMINOPHEN,PHENIRAMINE MALEATE, PHENYLEPHRINE HYDROCHLORIDE ONE ML: 650; 20; 10 POWDER, FOR SUSPENSION ORAL at 07:56

## 2024-08-13 RX ADMIN — ONDANSETRON PRN MG: 4 TABLET, ORALLY DISINTEGRATING ORAL at 07:59

## 2024-08-14 VITALS
DIASTOLIC BLOOD PRESSURE: 57 MMHG | RESPIRATION RATE: 14 BRPM | TEMPERATURE: 97.5 F | SYSTOLIC BLOOD PRESSURE: 98 MMHG | OXYGEN SATURATION: 92 % | HEART RATE: 85 BPM

## 2024-08-14 VITALS — RESPIRATION RATE: 16 BRPM | OXYGEN SATURATION: 96 %

## 2024-08-14 VITALS
TEMPERATURE: 97.2 F | SYSTOLIC BLOOD PRESSURE: 100 MMHG | OXYGEN SATURATION: 96 % | HEART RATE: 71 BPM | RESPIRATION RATE: 16 BRPM | DIASTOLIC BLOOD PRESSURE: 73 MMHG

## 2024-08-14 VITALS — OXYGEN SATURATION: 92 % | RESPIRATION RATE: 14 BRPM

## 2024-08-15 VITALS — RESPIRATION RATE: 14 BRPM | OXYGEN SATURATION: 92 %

## 2024-08-15 VITALS — RESPIRATION RATE: 16 BRPM | OXYGEN SATURATION: 96 %

## 2024-08-15 VITALS
OXYGEN SATURATION: 93 % | SYSTOLIC BLOOD PRESSURE: 97 MMHG | TEMPERATURE: 97.2 F | DIASTOLIC BLOOD PRESSURE: 63 MMHG | RESPIRATION RATE: 16 BRPM | HEART RATE: 80 BPM

## 2024-08-15 VITALS
TEMPERATURE: 96.9 F | OXYGEN SATURATION: 96 % | HEART RATE: 89 BPM | DIASTOLIC BLOOD PRESSURE: 83 MMHG | SYSTOLIC BLOOD PRESSURE: 117 MMHG | RESPIRATION RATE: 16 BRPM

## 2024-08-16 VITALS
SYSTOLIC BLOOD PRESSURE: 91 MMHG | RESPIRATION RATE: 14 BRPM | TEMPERATURE: 97.9 F | OXYGEN SATURATION: 92 % | HEART RATE: 86 BPM | DIASTOLIC BLOOD PRESSURE: 53 MMHG

## 2024-08-16 VITALS — DIASTOLIC BLOOD PRESSURE: 69 MMHG | HEART RATE: 95 BPM | SYSTOLIC BLOOD PRESSURE: 112 MMHG | OXYGEN SATURATION: 94 %

## 2024-08-16 VITALS
DIASTOLIC BLOOD PRESSURE: 73 MMHG | OXYGEN SATURATION: 96 % | TEMPERATURE: 97.8 F | RESPIRATION RATE: 16 BRPM | SYSTOLIC BLOOD PRESSURE: 107 MMHG | HEART RATE: 82 BPM

## 2024-08-16 VITALS — DIASTOLIC BLOOD PRESSURE: 70 MMHG | SYSTOLIC BLOOD PRESSURE: 110 MMHG | HEART RATE: 81 BPM

## 2024-08-16 VITALS — RESPIRATION RATE: 14 BRPM | OXYGEN SATURATION: 92 %

## 2024-08-16 VITALS — OXYGEN SATURATION: 96 % | RESPIRATION RATE: 16 BRPM

## 2024-08-17 VITALS — SYSTOLIC BLOOD PRESSURE: 117 MMHG | HEART RATE: 102 BPM | DIASTOLIC BLOOD PRESSURE: 72 MMHG | OXYGEN SATURATION: 94 %

## 2024-08-17 VITALS
OXYGEN SATURATION: 97 % | RESPIRATION RATE: 18 BRPM | HEART RATE: 86 BPM | DIASTOLIC BLOOD PRESSURE: 70 MMHG | SYSTOLIC BLOOD PRESSURE: 113 MMHG | TEMPERATURE: 98.8 F

## 2024-08-17 VITALS
SYSTOLIC BLOOD PRESSURE: 87 MMHG | OXYGEN SATURATION: 90 % | TEMPERATURE: 97.2 F | HEART RATE: 86 BPM | DIASTOLIC BLOOD PRESSURE: 47 MMHG | RESPIRATION RATE: 16 BRPM

## 2024-08-17 VITALS — RESPIRATION RATE: 16 BRPM

## 2024-08-17 LAB
ALBUMIN SERPL BCP-MCNC: 2.3 G/DL (ref 3.4–5)
ALBUMIN/GLOB SERPL: 0.6 {RATIO} (ref 1.1–1.5)
ALP SERPL-CCNC: 201 IU/L (ref 46–116)
ALT SERPL W P-5'-P-CCNC: 59 U/L (ref 12–78)
ANION GAP SERPL CALCULATED.3IONS-SCNC: 3 MMOL/L (ref 8–16)
ANISOCYTOSIS BLD QL SMEAR: (no result)
AST SERPL W P-5'-P-CCNC: 101 U/L (ref 10–37)
BASOPHILS # BLD AUTO: 0 X10'3 (ref 0–0.2)
BASOPHILS NFR BLD AUTO: 0.6 % (ref 0–1)
BILIRUB SERPL-MCNC: 0.8 MG/DL (ref 0.1–1)
BUN SERPL-MCNC: 10 MG/DL (ref 7–18)
BUN/CREAT SERPL: 12.7 (ref 10–20)
CALCIUM SERPL-MCNC: 8.6 MG/DL (ref 8.5–10.1)
CHLORIDE SERPL-SCNC: 104 MMOL/L (ref 99–107)
CO2 SERPL-SCNC: 33.4 MMOL/L (ref 24–32)
CREAT CL PREDICTED SERPL C-G-VRATE: 69 ML/MIN
CREAT SERPL-MCNC: 0.79 MG/DL (ref 0.4–0.9)
DACRYOCYTES BLD QL SMEAR: (no result)
EOSINOPHIL # BLD AUTO: 0.1 X10'3 (ref 0–0.9)
EOSINOPHIL NFR BLD AUTO: 1.6 % (ref 0–6)
ERYTHROCYTE [DISTWIDTH] IN BLOOD BY AUTOMATED COUNT: 20.8 % (ref 11.5–14.5)
GFR SERPL CREATININE-BSD FRML MDRD: 78 ML/MIN
GLOBULIN SER CALC-MCNC: 4 G/DL (ref 2.7–4.3)
GLUCOSE SERPL-MCNC: 92 MG/DL (ref 70–104)
HCT VFR BLD AUTO: 37.5 % (ref 35–45)
HGB BLD-MCNC: 12.2 G/DL (ref 12–16)
LYMPHOCYTES # BLD AUTO: 0.9 X10'3 (ref 1.1–4.8)
LYMPHOCYTES NFR BLD AUTO: 21.6 % (ref 21–51)
MACROCYTES BLD QL SMEAR: (no result)
MCH RBC QN AUTO: 34.2 PG (ref 27–31)
MCHC RBC AUTO-ENTMCNC: 32.6 G/DL (ref 33–36.5)
MCV RBC AUTO: 104.7 FL (ref 78–98)
MONOCYTES # BLD AUTO: 0.3 X10'3 (ref 0–0.9)
MONOCYTES NFR BLD AUTO: 7.9 % (ref 2–12)
NEUTROPHILS # BLD AUTO: 2.8 X10'3 (ref 1.8–7.7)
NEUTROPHILS NFR BLD AUTO: 68.3 % (ref 42–75)
PLATELET # BLD AUTO: 156 X10'3 (ref 140–440)
PLATELET BLD QL SMEAR: NORMAL
PMV BLD AUTO: 7 FL (ref 7.4–10.4)
POTASSIUM SERPL-SCNC: 4.3 MMOL/L (ref 3.5–5.1)
PROT SERPL-MCNC: 6.3 G/DL (ref 6.4–8.2)
RBC # BLD AUTO: 3.58 X10'6 (ref 4.2–5.6)
RBC MORPH BLD: (no result)
SODIUM SERPL-SCNC: 140 MMOL/L (ref 135–145)
STOMATOCYTES BLD QL SMEAR: (no result)
TSH SERPL DL<=0.05 MIU/L-ACNC: 2.89 ULU/ML (ref 0.34–4.5)
WBC # BLD AUTO: 4.1 X10'3 (ref 4.5–11)

## 2024-08-17 RX ADMIN — SODIUM CHLORIDE, SODIUM LACTATE, POTASSIUM CHLORIDE, AND CALCIUM CHLORIDE ONE MLS/HR: .6; .31; .03; .02 INJECTION, SOLUTION INTRAVENOUS at 15:13

## 2024-08-18 VITALS
RESPIRATION RATE: 15 BRPM | TEMPERATURE: 98.2 F | SYSTOLIC BLOOD PRESSURE: 102 MMHG | HEART RATE: 78 BPM | DIASTOLIC BLOOD PRESSURE: 66 MMHG | OXYGEN SATURATION: 93 %

## 2024-08-18 VITALS — RESPIRATION RATE: 15 BRPM | OXYGEN SATURATION: 93 %

## 2024-08-18 VITALS — OXYGEN SATURATION: 94 % | RESPIRATION RATE: 16 BRPM

## 2024-08-18 VITALS
OXYGEN SATURATION: 93 % | HEART RATE: 87 BPM | DIASTOLIC BLOOD PRESSURE: 78 MMHG | SYSTOLIC BLOOD PRESSURE: 114 MMHG | TEMPERATURE: 96.9 F | RESPIRATION RATE: 14 BRPM

## 2024-08-19 VITALS
DIASTOLIC BLOOD PRESSURE: 67 MMHG | HEART RATE: 80 BPM | RESPIRATION RATE: 16 BRPM | SYSTOLIC BLOOD PRESSURE: 101 MMHG | TEMPERATURE: 97.9 F | OXYGEN SATURATION: 94 %

## 2024-08-19 VITALS — RESPIRATION RATE: 16 BRPM | OXYGEN SATURATION: 94 %

## 2024-08-21 ENCOUNTER — HOSPITAL ENCOUNTER (EMERGENCY)
Dept: HOSPITAL 94 - ER | Age: 49
LOS: 1 days | Discharge: HOME | End: 2024-08-22
Payer: MEDICAID

## 2024-08-21 VITALS — HEIGHT: 62 IN | WEIGHT: 203.42 LBS | BODY MASS INDEX: 37.43 KG/M2

## 2024-08-21 DIAGNOSIS — S80.212A: Primary | ICD-10-CM

## 2024-08-21 DIAGNOSIS — Z88.6: ICD-10-CM
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
## 2024-08-21 DIAGNOSIS — F41.9: ICD-10-CM

## 2024-08-21 DIAGNOSIS — Z79.899: ICD-10-CM

## 2024-08-21 DIAGNOSIS — F32.A: ICD-10-CM
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
## 2024-08-21 DIAGNOSIS — Z88.8: ICD-10-CM
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
## 2024-08-21 DIAGNOSIS — Z90.49: ICD-10-CM
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
## 2024-08-21 PROCEDURE — 73564 X-RAY EXAM KNEE 4 OR MORE: CPT

## 2024-08-21 PROCEDURE — 99285 EMERGENCY DEPT VISIT HI MDM: CPT

## 2024-08-22 VITALS
RESPIRATION RATE: 16 BRPM | DIASTOLIC BLOOD PRESSURE: 74 MMHG | HEART RATE: 68 BPM | OXYGEN SATURATION: 97 % | TEMPERATURE: 98.3 F | SYSTOLIC BLOOD PRESSURE: 132 MMHG